# Patient Record
Sex: FEMALE | Race: WHITE | NOT HISPANIC OR LATINO | Employment: OTHER | URBAN - METROPOLITAN AREA
[De-identification: names, ages, dates, MRNs, and addresses within clinical notes are randomized per-mention and may not be internally consistent; named-entity substitution may affect disease eponyms.]

---

## 2017-03-16 ENCOUNTER — OFFICE VISIT (OUTPATIENT)
Dept: FAMILY MEDICINE | Facility: CLINIC | Age: 77
End: 2017-03-16

## 2017-03-16 VITALS
WEIGHT: 180 LBS | BODY MASS INDEX: 29.99 KG/M2 | TEMPERATURE: 97.8 F | HEIGHT: 65 IN | DIASTOLIC BLOOD PRESSURE: 60 MMHG | OXYGEN SATURATION: 97 % | RESPIRATION RATE: 16 BRPM | HEART RATE: 72 BPM | SYSTOLIC BLOOD PRESSURE: 114 MMHG

## 2017-03-16 DIAGNOSIS — J32.0 CHRONIC MAXILLARY SINUSITIS: ICD-10-CM

## 2017-03-16 DIAGNOSIS — R05.9 COUGH: ICD-10-CM

## 2017-03-16 DIAGNOSIS — R09.81 CHRONIC NASAL CONGESTION: Primary | ICD-10-CM

## 2017-03-16 PROCEDURE — 71020 XR CHEST 2 VW: CPT | Performed by: FAMILY MEDICINE

## 2017-03-16 PROCEDURE — 99213 OFFICE O/P EST LOW 20 MIN: CPT | Performed by: FAMILY MEDICINE

## 2017-03-16 PROCEDURE — 70220 X-RAY EXAM OF SINUSES: CPT | Performed by: FAMILY MEDICINE

## 2017-03-16 RX ORDER — CEFUROXIME AXETIL 500 MG/1
500 TABLET ORAL 2 TIMES DAILY
Qty: 42 TABLET | Refills: 0 | Status: SHIPPED | OUTPATIENT
Start: 2017-03-16 | End: 2017-04-06

## 2017-03-16 NOTE — PROGRESS NOTES
Problem(s) Oriented visit        SUBJECTIVE:                                                    Zora Holbrook is a 76 year old female who presents to clinic today for chronic congestion and yellow mucous. This is recurrent for many months. It seems to wax and wane. She does cough up yellow phlegm and hears a rattle in her chest when she lays in bed. She denies regular headaches although will sometimes wake with a pain just over her right eye. No vision problems. She has a hoarse voice. She feels febrile occasionally. No known allergies to environmental exposures. Her  also has chronic rhinitis and she wonders if there could be something in her house causing the issue.       Problem list, Medication list, Allergies, and Medical/Social/Surgical histories reviewed in [facility] and updated as appropriate.   Additional history: as documented    ROS:  5 point ROS completed and negative except noted above, including Gen, CV, Resp, GI, MS      Histories:   Patient Active Problem List   Diagnosis     Breast CA (H)     Hypercholesterolemia     Degenerative arthritis of lumbar spine     Hemorrhoids, external     Colon adenomas     Disorder of bone and cartilage     Osteopenia     Preventative health care     Need for prophylactic vaccination and inoculation against influenza     Health Care Home     Hip pain     Advance Care Planning     Glucose intolerance (impaired glucose tolerance)     Past Surgical History:   Procedure Laterality Date     DILATION AND CURETTAGE, HYSTEROSCOPY DIAGNOSTIC, COMBINED  3/14/2014    Procedure: COMBINED DILATION AND CURETTAGE, HYSTEROSCOPY DIAGNOSTIC;   DILATION AND CURETTAGE, HYSTEROSCOPY, POLYPECTOMY ;  Surgeon: Guillaume Quintanilla MD;  [facility]: [facility]     LUMPECTOMY BREAST  1995    Cancer Right side     [facility]     OVARY SURGERY      resection     Uterine cyste resection      [facility]       Social History   Substance Use Topics     Smoking status:  "Never Smoker     Smokeless tobacco: Never Used     Alcohol use No     Family History   Problem Relation Age of Onset     DIABETES Father      age 60's           OBJECTIVE:                                                    /60  Pulse 72  Temp 97.8  F (36.6  C) (Oral)  Resp 16  Ht 1.645 m (5' 4.75\")  Wt 81.6 kg (180 lb)  SpO2 97%  BMI 30.19 kg/m2  Body mass index is 30.19 kg/(m^2).   APPEARANCE: = Relaxed and in no distress  Conj/Eyelids = noninjected and lids and lashes are without inflammation  PERRLA/Irises = Pupils Round Reactive to Light and Irisis without inflammation  Ears/Nose = External structures and Nares have usual shape and form  Ears/Nose = mucosa erythematous and swollen  Ear canals and TM's = Canals are not inflammed and have none or little wax and the drums are not injected and have a light reflex   Lips/Teeth/Gums = No lesions seen, good dentition, and gums seem healthy  Oropharynx = No leukoplakia, No injection to the tissues, Normal Uvula  Neck = No anterior or posterior adenopathy appreciated.  Thyroid = Not enlarged and no masses felt  Resp effort = Calm regular breathing  Breath Sounds = Good air movement with no rales or rhonchi in any lung fields  Heart Rate, Rythym, & sounds (no Murm)  = Regular rate and rythym with no S3, S4, or murmer appreciated.  Mood/Affect = Cooperative and interested  Right maxillary opacity on sinus xray  CXR with elevated right hemidiaphragm  Sinus xray = opacification of the right maxillary sinus  CXR = normal   Labs Resulted Today:   Results for orders placed or performed during the hospital encounter of 10/10/16   MA Screen Bilateral w/Raf    Narrative    SCREENING MAMMOGRAM, BILATERAL, DIGITAL w/CAD AND TOMOSYNTHESIS -  10/10/2016 10:21 AM.    BREAST SYMPTOMS: No current breast complaints.     COMPARISON:  10/5/2015, 10/1/2014, 9/30/2013, 9/24/2012.    BREAST DENSITY: Heterogeneously dense.    COMMENTS: No findings of suspicion for malignancy.   " The patient has  had a prior right lumpectomy.      Impression    IMPRESSION: BI-RADS CATEGORY: 2 - Benign.    RECOMMENDED FOLLOW-UP: Annual Mammography.  Recommend routine annual screening mammography.    Exam results letter mailed to patient.    LENIN IRBY MD     ASSESSMENT/PLAN:                                                        Zora was seen today for sinus problem.    Diagnoses and all orders for this visit:    Chronic nasal congestion  -     X-ray Sinus complete G/E 3 vws  -     X-ray Chest 2 vws*    Cough  -     X-ray Chest 2 vws*    Chronic maxillary sinusitis  -     cefUROXime (CEFTIN) 500 MG tablet; Take 1 tablet (500 mg) by mouth 2 times daily for 21 days  Given the duration of her symptoms she is treated for 3 weeks. F/U with ENT if failure to improve.  There are no Patient Instructions on file for this visit.    The following health maintenance items are reviewed in Epic and correct as of today:  Health Maintenance   Topic Date Due     MEDICARE ANNUAL WELLNESS VISIT  09/17/2016     FALL RISK ASSESSMENT  09/17/2016     PNEUMOCOCCAL (2 of 2 - PPSV23) 09/17/2016     INFLUENZA VACCINE (SYSTEM ASSIGNED)  09/01/2017     COLONOSCOPY Q5 YR INBASKET MESSAGE  10/29/2018     TETANUS IMMUNIZATION (SYSTEM ASSIGNED)  03/17/2019     LIPID SCREEN Q5 YR FEMALE (SYSTEM ASSIGNED)  11/04/2020     ADVANCE DIRECTIVE PLANNING Q5 YRS (NO INBASKET)  11/10/2020     DEXA SCAN SCREENING (SYSTEM ASSIGNED)  Completed       Clarisa Qureshi MD  [facility]  [facility]  [phone]    For any issues my office # is [phone]

## 2017-03-16 NOTE — MR AVS SNAPSHOT
"              After Visit Summary   3/16/2017    Zora Holbrook    MRN: [id number]           Patient Information     Date Of Birth          1940        Visit Information        Provider Department      3/16/2017 10:00 AM Clarisa Qureshi MD [facility]        Today's Diagnoses     Chronic nasal congestion    -  1    Cough        Chronic maxillary sinusitis           Follow-ups after your visit        Who to contact     If you have questions or need follow up information about today's clinic visit or your schedule please contact [facility] directly at [phone].  Normal or non-critical lab and imaging results will be communicated to you by Wisrhart, letter or phone within 4 business days after the clinic has received the results. If you do not hear from us within 7 days, please contact the clinic through [employer] or phone. If you have a critical or abnormal lab result, we will notify you by phone as soon as possible.  Submit refill requests through HutGrip or call your pharmacy and they will forward the refill request to us. Please allow 3 business days for your refill to be completed.          Additional Information About Your Visit        MyChart Information     HutGrip lets you send messages to your doctor, view your test results, renew your prescriptions, schedule appointments and more. To sign up, go to [email or web address] . Click on \"Log in\" on the left side of the screen, which will take you to the Welcome page. Then click on \"Sign up Now\" on the right side of the page.     You will be asked to enter the access code listed below, as well as some personal information. Please follow the directions to create your username and password.     Your access code is: [email or web address]  Expires: 2017  1:14 PM     Your access code will  in 90 days. If you need help or a new code, please call your [place] clinic or [phone].        Care EveryWhere ID     This is your " "Care EveryWhere ID. This could be used by other organizations to access your [place] medical records  [id number]        Your Vitals Were     Pulse Temperature Respirations Height Pulse Oximetry BMI (Body Mass Index)    72 97.8  F (36.6  C) (Oral) 16 1.645 m (5' 4.75\") 97% 30.19 kg/m2       Blood Pressure from Last 3 Encounters:   03/16/17 114/60   09/19/16 120/80   04/29/16 110/62    Weight from Last 3 Encounters:   03/16/17 81.6 kg (180 lb)   09/19/16 80.7 kg (178 lb)   04/29/16 81.1 kg (178 lb 12.8 oz)              We Performed the Following     X-ray Chest 2 vws*     X-ray Sinus complete G/E 3 vws          Today's Medication Changes          These changes are accurate as of: 3/16/17 11:59 PM.  If you have any questions, ask your nurse or doctor.               Start taking these medicines.        Dose/Directions    cefuroxime 500 MG tablet   Commonly known as:  CEFTIN   Used for:  Chronic maxillary sinusitis        Dose:  500 mg   Take 1 tablet (500 mg) by mouth 2 times daily for 21 days   Quantity:  42 tablet   Refills:  0            Where to get your medicines      These medications were sent to [facility] [address] [place] [address]     Phone:  [phone]     cefuroxime 500 MG tablet                Primary Care Provider Office Phone # Fax #    Clarisa Qureshi [phone]       [facility] [address]  [facility] [address]        Thank you!     Thank you for choosing [facility]  for your care. Our goal is always to provide you with excellent care. Hearing back from our patients is one way we can continue to improve our services. Please take a few minutes to complete the written survey that you may receive in the mail after your visit with us. Thank you!             Your Updated Medication List - Protect others around you: Learn how to safely use, store and throw away your medicines at " www.disposemymeds.org.          This list is accurate as of: 3/16/17 11:59 PM.  Always use your most recent med list.                   Brand Name Dispense Instructions for use    aspirin 81 MG tablet      Take 1 tablet by mouth daily.       CALCIUM 600 + D PO      Take 600 mg by mouth 2 times daily.       cefuroxime 500 MG tablet    CEFTIN    42 tablet    Take 1 tablet (500 mg) by mouth 2 times daily for 21 days       CENTRUM SILVER per tablet      Take 1 tablet by mouth daily.       simvastatin 20 MG tablet    ZOCOR    90 tablet    Take 1 tablet (20 mg) by mouth At Bedtime       TYLENOL 325 MG tablet   Generic drug:  acetaminophen      Take 325-650 mg by mouth daily as needed.       vitamin D 1000 UNITS capsule      Take 1 capsule by mouth daily.

## 2017-04-07 ENCOUNTER — TELEPHONE (OUTPATIENT)
Dept: FAMILY MEDICINE | Facility: CLINIC | Age: 77
End: 2017-04-07

## 2017-04-07 NOTE — TELEPHONE ENCOUNTER
4/5/17 patient calls with update on sinus symptoms -- has 1 day left of 3 week ceftin course. Estimates 80% improved since 3/16/17 visit with Dr. Qureshi.   Plan: per Dr. Qureshi - finish antibiotics and observe, if symptoms don't continue to improve or she gets worse, would recommend CT sinuses. Patient agrees and will call us prn.  Shyanne Gonzalez RN

## 2017-09-19 ENCOUNTER — OFFICE VISIT (OUTPATIENT)
Dept: FAMILY MEDICINE | Facility: CLINIC | Age: 77
End: 2017-09-19

## 2017-09-19 VITALS
WEIGHT: 181.8 LBS | TEMPERATURE: 99 F | HEART RATE: 75 BPM | BODY MASS INDEX: 30.29 KG/M2 | SYSTOLIC BLOOD PRESSURE: 114 MMHG | OXYGEN SATURATION: 95 % | DIASTOLIC BLOOD PRESSURE: 78 MMHG | HEIGHT: 65 IN | RESPIRATION RATE: 16 BRPM

## 2017-09-19 DIAGNOSIS — M85.80 OSTEOPENIA, UNSPECIFIED LOCATION: ICD-10-CM

## 2017-09-19 DIAGNOSIS — R73.02 GLUCOSE INTOLERANCE (IMPAIRED GLUCOSE TOLERANCE): ICD-10-CM

## 2017-09-19 DIAGNOSIS — Z23 NEED FOR PROPHYLACTIC VACCINATION AND INOCULATION AGAINST INFLUENZA: Primary | ICD-10-CM

## 2017-09-19 DIAGNOSIS — R25.2 CRAMP OF LIMB: ICD-10-CM

## 2017-09-19 DIAGNOSIS — E78.5 DYSLIPIDEMIA: ICD-10-CM

## 2017-09-19 LAB
% GRANULOCYTES: 67.8 % (ref 42.2–75.2)
HCT VFR BLD AUTO: 44.5 % (ref 35–46)
HEMOGLOBIN: 14.5 G/DL (ref 11.8–15.5)
LYMPHOCYTES NFR BLD AUTO: 24.5 % (ref 20.5–51.1)
MCH RBC QN AUTO: 29.5 PG (ref 27–31)
MCHC RBC AUTO-ENTMCNC: 32.5 G/DL (ref 33–37)
MCV RBC AUTO: 90.7 FL (ref 80–100)
MONOCYTES NFR BLD AUTO: 7.7 % (ref 1.7–9.3)
PLATELET # BLD AUTO: 131 K/UL (ref 140–450)
RBC # BLD AUTO: 4.9 X10/CMM (ref 3.7–5.2)
WBC # BLD AUTO: 4.7 X10/CMM (ref 3.8–11)

## 2017-09-19 PROCEDURE — 90662 IIV NO PRSV INCREASED AG IM: CPT | Performed by: FAMILY MEDICINE

## 2017-09-19 PROCEDURE — 99214 OFFICE O/P EST MOD 30 MIN: CPT | Mod: 25 | Performed by: FAMILY MEDICINE

## 2017-09-19 PROCEDURE — 36415 COLL VENOUS BLD VENIPUNCTURE: CPT | Performed by: FAMILY MEDICINE

## 2017-09-19 PROCEDURE — G0008 ADMIN INFLUENZA VIRUS VAC: HCPCS | Performed by: FAMILY MEDICINE

## 2017-09-19 PROCEDURE — 85025 COMPLETE CBC W/AUTO DIFF WBC: CPT | Performed by: FAMILY MEDICINE

## 2017-09-19 RX ORDER — SIMVASTATIN 20 MG
20 TABLET ORAL AT BEDTIME
Qty: 90 TABLET | Refills: 3 | Status: SHIPPED | OUTPATIENT
Start: 2017-09-19 | End: 2018-09-14

## 2017-09-19 NOTE — PROGRESS NOTES
Problem(s) Oriented visit        SUBJECTIVE:                                                    Zora Holbrook is a 76 year old female who presents to clinic today for medication check. She has no complaints. She exercises by walking about 30 minutes every morning. No chest pain or pressure with exertion. She has high cholesterol and takes simvastatin. She does tend to get leg cramps that wake her from sleep about 3 times weekly. She had normal colonoscopy in 2013 and denies any change in bowel habits. She had normal mammo done earlier this month.       Problem list, Medication list, Allergies, and Medical/Social/Surgical histories reviewed in EPIC and updated as appropriate.   Additional history: as documented    ROS:  5 point ROS completed and negative except noted above, including Gen, CV, Resp, GI, MS      Histories:   Patient Active Problem List   Diagnosis     Breast CA (H)     Hypercholesterolemia     Degenerative arthritis of lumbar spine     Hemorrhoids, external     Colon adenomas     Osteopenia     Preventative health care     Need for prophylactic vaccination and inoculation against influenza     Health Care Home     Hip pain     Advance Care Planning     Glucose intolerance (impaired glucose tolerance)     Past Surgical History:   Procedure Laterality Date     DILATION AND CURETTAGE, HYSTEROSCOPY DIAGNOSTIC, COMBINED  3/14/2014    Procedure: COMBINED DILATION AND CURETTAGE, HYSTEROSCOPY DIAGNOSTIC;   DILATION AND CURETTAGE, HYSTEROSCOPY, POLYPECTOMY ;  Surgeon: Guillaume Quintanilla MD;  Location: [facility]     LUMPECTOMY BREAST  1995    Cancer Right side     LUMPECTOMY BREAST      [facility],Left side     OVARY SURGERY      resection     Uterine cyste resection      [facility]       Social History   Substance Use Topics     Smoking status: Never Smoker     Smokeless tobacco: Never Used     Alcohol use No     Family History   Problem Relation Age of Onset     DIABETES Father      age 60's      "      OBJECTIVE:                                                    /78  Pulse 75  Temp 99  F (37.2  C) (Oral)  Resp 16  Ht 1.645 m (5' 4.75\")  Wt 82.5 kg (181 lb 12.8 oz)  SpO2 95%  BMI 30.49 kg/m2  Body mass index is 30.49 kg/(m^2).   GENERAL APPEARANCE: Alert, no acute distress  NECK: No adenopathy,masses or thyromegaly  RESP: lungs clear to auscultation   CV: normal rate, regular rhythm, no murmur or gallop  ABDOMEN: soft, no organomegaly, masses or tenderness  LYMPHATICS: No cervical, supraclavicular or inguinal adenopathy  MS: extremities normal, no peripheral edema  NEURO: Alert, oriented, speech and mentation normal  PSYCH: mentation appears normal, affect and mood normal   Labs Resulted Today:   Results for orders placed or performed during the hospital encounter of 10/10/16   MA Screen Bilateral w/Raf    Narrative    SCREENING MAMMOGRAM, BILATERAL, DIGITAL w/CAD AND TOMOSYNTHESIS -  10/10/2016 10:21 AM.    BREAST SYMPTOMS: No current breast complaints.     COMPARISON:  10/5/2015, 10/1/2014, 9/30/2013, 9/24/2012.    BREAST DENSITY: Heterogeneously dense.    COMMENTS: No findings of suspicion for malignancy.   The patient has  had a prior right lumpectomy.      Impression    IMPRESSION: BI-RADS CATEGORY: 2 - Benign.    RECOMMENDED FOLLOW-UP: Annual Mammography.  Recommend routine annual screening mammography.    Exam results letter mailed to patient.    LENIN IRBY MD     ASSESSMENT/PLAN:                                                        Zora was seen today for recheck medication, flu shot and refill request.    Diagnoses and all orders for this visit:    Need for prophylactic vaccination and inoculation against influenza  -     FLU VACCINE, INCREASED ANTIGEN, PRESV FREE  -     ADMIN INFLUENZA VIRUS VAC    Dyslipidemia  -     simvastatin (ZOCOR) 20 MG tablet; Take 1 tablet (20 mg) by mouth At Bedtime  -     Comp. Metabolic Panel (14) ([facility])    Cramp of limb  -     CBC with Diff/Plt " ([facility])  -     Comp. Metabolic Panel (14) ([facility])    Glucose intolerance (impaired glucose tolerance)  -     Hemoglobin A1C ([facility]), printed information is given on diabetic diet.     Osteopenia, unspecified location  -     Vitamin D  25-Hydroxy ([facility])  Continue dietary calcium, weight bearing exercise, and vitamin D supplement.           The following health maintenance items are reviewed in Epic and correct as of today:  Health Maintenance   Topic Date Due     MEDICARE ANNUAL WELLNESS VISIT  09/17/2016     FALL RISK ASSESSMENT  09/17/2016     PNEUMOCOCCAL (2 of 2 - PPSV23) 09/17/2016     INFLUENZA VACCINE (SYSTEM ASSIGNED)  09/01/2017     COLONOSCOPY Q5 YR  10/29/2018     TETANUS IMMUNIZATION (SYSTEM ASSIGNED)  03/17/2019     LIPID SCREEN Q5 YR FEMALE (SYSTEM ASSIGNED)  11/04/2020     ADVANCE DIRECTIVE PLANNING Q5 YRS  11/10/2020     DEXA SCAN SCREENING (SYSTEM ASSIGNED)  Completed       Clarisa Qureshi MD  [facility]  Family Practice  [facility]  [phone]    For any issues my office # is [phone]

## 2017-09-19 NOTE — MR AVS SNAPSHOT
"              After Visit Summary   9/19/2017    Zora Holbrook    MRN: [id number]           Patient Information     Date Of Birth          1940        Visit Information        Provider Department      9/19/2017 10:00 AM Clarisa Qureshi MD [facility]        Today's Diagnoses     Need for prophylactic vaccination and inoculation against influenza    -  1    Dyslipidemia        Cramp of limb        Glucose intolerance (impaired glucose tolerance)        Osteopenia, unspecified location           Follow-ups after your visit        Your next 10 appointments already scheduled     Oct 16, 2017 10:00 AM CDT   MA SCREENING BILATERAL W/ FERNANDO with [id number]   [facility] ([facility])    [address] [place] [address], [address]  [facility] [address] [phone]           Three-dimensional (3D) mammograms are available at [place] locations in [facility], [place], [place], [facility], [place], [place], and [place]. [facility] locations include [place] and [facility] in [place]. Benefits of 3D mammograms include: - Improved rate of cancer detection - Decreases your chance of having to go back for more tests, which means fewer: - \"False-positive\" results (This means that there is an abnormal area but it isn't cancer.) - Invasive testing procedures, such as a biopsy or surgery - Can provide clearer images of the breast if you have dense breast tissue. 3D mammography is an optional exam that anyone can have with a 2D mammogram. It doesn't replace or take the place of a 2D mammogram. 2D mammograms remain an effective screening test for all women.  Not all insurance companies cover the cost of a 3D mammogram. Check with your insurance.              Who to contact     If you have questions or need follow up information about today's clinic visit or your schedule please contact [facility] directly at [phone].  Normal or " "non-critical lab and imaging results will be communicated to you by MyChart, letter or phone within 4 business days after the clinic has received the results. If you do not hear from us within 7 days, please contact the clinic through NICEt or phone. If you have a critical or abnormal lab result, we will notify you by phone as soon as possible.  Submit refill requests through Provigent or call your pharmacy and they will forward the refill request to us. Please allow 3 business days for your refill to be completed.          Additional Information About Your Visit        Provigent Information     Provigent lets you send messages to your doctor, view your test results, renew your prescriptions, schedule appointments and more. To sign up, go to [email or web address] . Click on \"Log in\" on the left side of the screen, which will take you to the Welcome page. Then click on \"Sign up Now\" on the right side of the page.     You will be asked to enter the access code listed below, as well as some personal information. Please follow the directions to create your username and password.     Your access code is: [email or web address]  Expires: 2017  1:20 PM     Your access code will  in 90 days. If you need help or a new code, please call your [place] clinic or [phone].        Care EveryWhere ID     This is your Care EveryWhere ID. This could be used by other organizations to access your [place] medical records  [id number]        Your Vitals Were     Pulse Temperature Respirations Height Pulse Oximetry BMI (Body Mass Index)    75 99  F (37.2  C) (Oral) 16 1.645 m (5' 4.75\") 95% 30.49 kg/m2       Blood Pressure from Last 3 Encounters:   17 114/78   17 114/60   16 120/80    Weight from Last 3 Encounters:   17 82.5 kg (181 lb 12.8 oz)   17 81.6 kg (180 lb)   16 80.7 kg (178 lb)              We Performed the Following     ADMIN INFLUENZA VIRUS VAC     CBC with Diff/Plt ([facility])     Comp. " Metabolic Panel (14) ([facility])     FLU VACCINE, INCREASED ANTIGEN, PRESV FREE     Hemoglobin A1C ([facility])     Vitamin D  25-Hydroxy ([facility])          Where to get your medicines      These medications were sent to [facility] - [place], [place] - [address]  [address], [facility] [address]     Phone:  [phone]     simvastatin 20 MG tablet          Primary Care Provider Office Phone # Fax #    Clarisa Tori Qureshi [phone]       [facility] [address] [facility] [address]        Equal Access to Services     [facility] Hospital[facility]EL : Hadii aad ku hadasho Soomaali, waaxda luqadaha, qaybta kaalmada adeegyada, zuri peck . So [facility] [phone].    ATENCIÓN: Si habla español, tiene a victoria disposición servicios gratuitos de asistencia lingüística. [facility] [phone].    We comply with applicable federal civil rights laws and [place] laws. We do not discriminate on the basis of race, color, national origin, age, disability sex, sexual orientation or gender identity.            Thank you!     Thank you for choosing [facility]  for your care. Our goal is always to provide you with excellent care. Hearing back from our patients is one way we can continue to improve our services. Please take a few minutes to complete the written survey that you may receive in the mail after your visit with us. Thank you!             Your Updated Medication List - Protect others around you: Learn how to safely use, store and throw away your medicines at www.disposemymeds.org.          This list is accurate as of: 9/19/17  1:20 PM.  Always use your most recent med list.                   Brand Name Dispense Instructions for use Diagnosis    aspirin 81 MG tablet      Take 1 tablet by mouth daily.        CALCIUM 600 + D PO      Take 600 mg by mouth 2 times daily.        CENTRUM SILVER per tablet      Take 1 tablet by mouth daily.         simvastatin 20 MG tablet    ZOCOR    90 tablet    Take 1 tablet (20 mg) by mouth At Bedtime    Dyslipidemia       TYLENOL 325 MG tablet   Generic drug:  acetaminophen      Take 325-650 mg by mouth daily as needed.        vitamin D 1000 UNITS capsule      Take 1 capsule by mouth daily.

## 2017-09-19 NOTE — PROGRESS NOTES
Injectable Influenza Immunization Documentation    1.  Is the person to be vaccinated sick today? No    2. Does the person to be vaccinated have an allergy to a component   of the vaccine? No    3. Has the person to be vaccinated ever had a serious reaction   to influenza vaccine in the past? No    4. Has the person to be vaccinated ever had Guillain-Barré syndrome? No    Form completed by Keely Mcgee MA

## 2017-09-19 NOTE — LETTER
[facility]  [address] [place]  [address]  Phone: [phone]    September 21, 2017      Zora Holbrook  [address] [facility] [address]              Dear Zora,    The results from your recent visit showed No reason for cramping is found. Recommend drinking more liquids. Platelets are mildly low, but not worrisome. Vitamin D level is normal. Diabetes test is still only in the pre-diabetes range. Continue low carb diet and add exercise and hopefully this won't progress.        Sincerely,     Clarisa Qureshi M.D.    Results for orders placed or performed in visit on 09/19/17   CBC with Diff/Plt ([facility])   Result Value Ref Range    WBC x10/cmm 4.7 3.8 - 11.0 x10/cmm    % Lymphocytes 24.5 20.5 - 51.1 %    % Monocytes 7.7 1.7 - 9.3 %    % Granulocytes 67.8 42.2 - 75.2 %    RBC x10/cmm 4.90 3.7 - 5.2 x10/cmm    Hemoglobin 14.5 11.8 - 15.5 g/dl    Hematocrit 44.5 35 - 46 %    MCV 90.7 80 - 100 fL    MCH 29.5 27.0 - 31.0 pg    MCHC 32.5 (A) 33.0 - 37.0 g/dL    Platelet Count 131 (A) 140 - 450 K/uL   Comp. Metabolic Panel (14) ([facility])   Result Value Ref Range    Glucose 106 (H) 65 - 99 mg/dL    Urea Nitrogen 17 8 - 27 mg/dL    Creatinine 1.03 (H) 0.57 - 1.00 mg/dL    eGFR If NonAfricn Am 52 (L) >59 mL/min/1.73    eGFR If Africn Am 60 >59 mL/min/1.73    BUN/Creatinine Ratio 17 12 - 28    Sodium 143 134 - 144 mmol/L    Potassium 4.2 3.5 - 5.2 mmol/L    Chloride 101 96 - 106 mmol/L    Total CO2 23 18 - 28 mmol/L    Calcium 9.1 8.7 - 10.3 mg/dL    Protein Total 7.0 6.0 - 8.5 g/dL    Albumin 4.6 3.5 - 4.8 g/dL    Globulin, Total 2.4 1.5 - 4.5 g/dL    A/G Ratio 1.9 1.2 - 2.2    Bilirubin Total 0.8 0.0 - 1.2 mg/dL    Alkaline Phosphatase 51 39 - 117 IU/L    AST 23 0 - 40 IU/L    ALT 17 0 - 32 IU/L    Narrative    Performed at:  01 - [facility] [address], [place], [place]  [id number]  : Sudheer Tovar MD, Phone:  [phone]   Hemoglobin A1C ([facility])   Result Value Ref  Range    Hemoglobin A1C 6.0 (H) 4.8 - 5.6 %    Narrative    Performed at:  01 - [facility] [address], [place], [place]  [id number]  : Sudheer Tovar MD, Phone:  [phone]   Vitamin D  25-Hydroxy ([facility])   Result Value Ref Range    Vitamin D,25-Hydroxy 64.4 30.0 - 100.0 ng/mL    Narrative    Performed at:  01 - [facility] [address], [place], [place]  [id number]  : Sudheer Tovar MD, Phone:  [phone]

## 2017-09-20 LAB
ALBUMIN SERPL-MCNC: 4.6 G/DL (ref 3.5–4.8)
ALBUMIN/GLOB SERPL: 1.9 {RATIO} (ref 1.2–2.2)
ALP SERPL-CCNC: 51 IU/L (ref 39–117)
ALT SERPL-CCNC: 17 IU/L (ref 0–32)
AST SERPL-CCNC: 23 IU/L (ref 0–40)
BILIRUB SERPL-MCNC: 0.8 MG/DL (ref 0–1.2)
BUN SERPL-MCNC: 17 MG/DL (ref 8–27)
BUN/CREATININE RATIO: 17 (ref 12–28)
CALCIUM SERPL-MCNC: 9.1 MG/DL (ref 8.7–10.3)
CHLORIDE SERPLBLD-SCNC: 101 MMOL/L (ref 96–106)
CREAT SERPL-MCNC: 1.03 MG/DL (ref 0.57–1)
EGFR IF AFRICN AM: 60 ML/MIN/1.73
EGFR IF NONAFRICN AM: 52 ML/MIN/1.73
GLOBULIN, TOTAL: 2.4 G/DL (ref 1.5–4.5)
GLUCOSE SERPL-MCNC: 106 MG/DL (ref 65–99)
HBA1C MFR BLD: 6 % (ref 4.8–5.6)
POTASSIUM SERPL-SCNC: 4.2 MMOL/L (ref 3.5–5.2)
PROT SERPL-MCNC: 7 G/DL (ref 6–8.5)
SODIUM SERPL-SCNC: 143 MMOL/L (ref 134–144)
TOTAL CO2: 23 MMOL/L (ref 18–28)
VITAMIN D, 25-HYDROXY: 64.4 NG/ML (ref 30–100)

## 2017-10-10 ENCOUNTER — OFFICE VISIT (OUTPATIENT)
Dept: FAMILY MEDICINE | Facility: CLINIC | Age: 77
End: 2017-10-10

## 2017-10-10 VITALS
SYSTOLIC BLOOD PRESSURE: 116 MMHG | BODY MASS INDEX: 30.35 KG/M2 | RESPIRATION RATE: 16 BRPM | TEMPERATURE: 97.8 F | DIASTOLIC BLOOD PRESSURE: 78 MMHG | OXYGEN SATURATION: 98 % | WEIGHT: 181 LBS | HEART RATE: 68 BPM

## 2017-10-10 DIAGNOSIS — R51.9 MILD HEADACHE: ICD-10-CM

## 2017-10-10 DIAGNOSIS — R58 BLEEDING: Primary | ICD-10-CM

## 2017-10-10 LAB
% GRANULOCYTES: 67.6 % (ref 42.2–75.2)
HCT VFR BLD AUTO: 48.8 % (ref 35–46)
HEMOGLOBIN: 15.8 G/DL (ref 11.8–15.5)
LYMPHOCYTES NFR BLD AUTO: 24.2 % (ref 20.5–51.1)
MCH RBC QN AUTO: 29.6 PG (ref 27–31)
MCHC RBC AUTO-ENTMCNC: 32.5 G/DL (ref 33–37)
MCV RBC AUTO: 91.2 FL (ref 80–100)
MONOCYTES NFR BLD AUTO: 8.2 % (ref 1.7–9.3)
PLATELET # BLD AUTO: 130 K/UL (ref 140–450)
RBC # BLD AUTO: 5.35 X10/CMM (ref 3.7–5.2)
WBC # BLD AUTO: 4.9 X10/CMM (ref 3.8–11)

## 2017-10-10 PROCEDURE — 85025 COMPLETE CBC W/AUTO DIFF WBC: CPT | Performed by: FAMILY MEDICINE

## 2017-10-10 PROCEDURE — 36415 COLL VENOUS BLD VENIPUNCTURE: CPT | Performed by: FAMILY MEDICINE

## 2017-10-10 PROCEDURE — 99213 OFFICE O/P EST LOW 20 MIN: CPT | Performed by: FAMILY MEDICINE

## 2017-10-10 NOTE — PROGRESS NOTES
"Cc sinus symptoms    HCM due for PPSV23  SUBJECTIVE:   Zora Holbrook is a 77 year old female who presents to clinic today for the following health issues:  White female glasses  RESPIRATORY SYMPTOMS \"Sinus\"      Duration: years now blood in mucus spitting in the sink last Friday . Awakens with HA over right eye.Better today    No allergy    Never smoker    Travel no    Exposure    Description  See above    Severity: mild    Accompanying signs and symptoms: None    History (predisposing factors):  none    Precipitating or alleviating factors: tylenol for HA helped    Therapies tried and outcome:  rest and fluids acetaminophen            Problem list and histories reviewed & adjusted, as indicated.  Additional history: as documented    Patient Active Problem List   Diagnosis     Breast CA (H)     Hypercholesterolemia     Degenerative arthritis of lumbar spine     Hemorrhoids, external     Colon adenomas     Osteopenia     Preventative health care     Need for prophylactic vaccination and inoculation against influenza     Health Care Home     Hip pain     Advance Care Planning     Glucose intolerance (impaired glucose tolerance)     Past Surgical History:   Procedure Laterality Date     DILATION AND CURETTAGE, HYSTEROSCOPY DIAGNOSTIC, COMBINED  3/14/2014    Procedure: COMBINED DILATION AND CURETTAGE, HYSTEROSCOPY DIAGNOSTIC;   DILATION AND CURETTAGE, HYSTEROSCOPY, POLYPECTOMY ;  Surgeon: Guillaume Quintanilla MD;  Location: [facility]     LUMPECTOMY BREAST  1995    Cancer Right side     [facility] BRE[facility]     OVARY SURGERY      resection     Uterine cyste resection      [facility]       Social History   Substance Use Topics     Smoking status: Never Smoker     Smokeless tobacco: Never Used     Alcohol use No     Family History   Problem Relation Age of Onset     DIABETES Father      age 60's         Current Outpatient Prescriptions   Medication Sig Dispense Refill     simvastatin (ZOCOR) 20 MG " "tablet Take 1 tablet (20 mg) by mouth At Bedtime 90 tablet 3     aspirin 81 MG tablet Take 1 tablet by mouth daily.       acetaminophen (TYLENOL) 325 MG tablet Take 325-650 mg by mouth daily as needed.       Calcium Carbonate-Vitamin D (CALCIUM 600 + D OR) Take 600 mg by mouth 2 times daily.       Multiple Vitamins-Minerals (CENTRUM SILVER) per tablet Take 1 tablet by mouth daily.       Cholecalciferol (VITAMIN D) 1000 UNIT capsule Take 1 capsule by mouth daily.       Allergies   Allergen Reactions     Nsaids      Affects kidney tests     Blue Dyes Rash     Recent Labs   Lab Test  09/19/17   1052  09/19/16   1049  04/29/16   1025  11/04/15   1422  08/28/14   1045 08/28/14 08/22/13   A1C  6.0*  5.8*  5.7*   --    --    --    --    --    LDL   --    --    --   70   --   71   --   76   HDL   --    --    --   49   --   42   --   45   TRIG   --    --    --   103   --   119   --   133   ALT  17   --    --   19  21   --    < >   --    CR  1.03*   --    --   0.95  0.91   --    < >   --    POTASSIUM  4.2   --    --   4.4  4.1   --    < >   --     < > = values in this interval not displayed.      BP Readings from Last 3 Encounters:   10/10/17 116/78   09/19/17 114/78   03/16/17 114/60    Wt Readings from Last 3 Encounters:   10/10/17 82.1 kg (181 lb)   09/19/17 82.5 kg (181 lb 12.8 oz)   03/16/17 81.6 kg (180 lb)       Estimated body mass index is 30.35 kg/(m^2) as calculated from the following:    Height as of 9/19/17: 1.645 m (5' 4.75\").    Weight as of this encounter: 82.1 kg (181 lb).  Weight loss is recommended           Labs reviewed in EPIC          Reviewed and updated as needed this visit by clinical staff     Reviewed and updated as needed this visit by Provider         ROS:  Constitutional, HEENT, cardiovascular, pulmonary, GI, , musculoskeletal, neuro, skin, endocrine and psych systems are negative, except as otherwise noted.      OBJECTIVE:   /78 (Cuff Size: Adult Large)  Pulse 68  Temp 97.8  F (36.6 "  C) (Oral)  Resp 16  Wt 82.1 kg (181 lb)  SpO2 98%  BMI 30.35 kg/m2  There is no height or weight on file to calculate BMI.  GENERAL: healthy, alert and no distress  EYES: Eyes grossly normal to inspection, PERRL and conjunctivae and sclerae normal  HENT: ear canals and TM's normal, nose and mouth without ulcers or lesions SINUS no tenderness  NECK: no adenopathy, no asymmetry, masses, or scars and thyroid normal to palpation  RESP: lungs clear to auscultation - no rales, rhonchi or wheezes  CV: regular rate and rhythm, normal S1 S2, no S3 or S4, no murmur, click or rub, no peripheral edema and peripheral pulses strong  ABDOMEN: soft, nontender, no hepatosplenomegaly, no masses and bowel sounds normal  MS: no gross musculoskeletal defects noted, no edema  SKIN: no suspicious lesions or rashes  NEURO: Normal strength and tone, mentation intact and speech normal  PSYCH: mentation appears normal, affect normal/bright    Diagnostic Test Results:  Results for orders placed or performed in visit on 09/19/17   CBC with Diff/Plt ([facility])   Result Value Ref Range    WBC x10/cmm 4.7 3.8 - 11.0 x10/cmm    % Lymphocytes 24.5 20.5 - 51.1 %    % Monocytes 7.7 1.7 - 9.3 %    % Granulocytes 67.8 42.2 - 75.2 %    RBC x10/cmm 4.90 3.7 - 5.2 x10/cmm    Hemoglobin 14.5 11.8 - 15.5 g/dl    Hematocrit 44.5 35 - 46 %    MCV 90.7 80 - 100 fL    MCH 29.5 27.0 - 31.0 pg    MCHC 32.5 (A) 33.0 - 37.0 g/dL    Platelet Count 131 (A) 140 - 450 K/uL   Comp. Metabolic Panel (14) ([facility])   Result Value Ref Range    Glucose 106 (H) 65 - 99 mg/dL    Urea Nitrogen 17 8 - 27 mg/dL    Creatinine 1.03 (H) 0.57 - 1.00 mg/dL    eGFR If NonAfricn Am 52 (L) >59 mL/min/1.73    eGFR If Africn Am 60 >59 mL/min/1.73    BUN/Creatinine Ratio 17 12 - 28    Sodium 143 134 - 144 mmol/L    Potassium 4.2 3.5 - 5.2 mmol/L    Chloride 101 96 - 106 mmol/L    Total CO2 23 18 - 28 mmol/L    Calcium 9.1 8.7 - 10.3 mg/dL    Protein Total 7.0 6.0 - 8.5 g/dL    Albumin 4.6  3.5 - 4.8 g/dL    Globulin, Total 2.4 1.5 - 4.5 g/dL    A/G Ratio 1.9 1.2 - 2.2    Bilirubin Total 0.8 0.0 - 1.2 mg/dL    Alkaline Phosphatase 51 39 - 117 IU/L    AST 23 0 - 40 IU/L    ALT 17 0 - 32 IU/L    Narrative    Performed at:  01 - [facility] [address], [place], [place]  [id number]  : Sudheer Tovar MD, Phone:  [phone]   Hemoglobin A1C ([facility])   Result Value Ref Range    Hemoglobin A1C 6.0 (H) 4.8 - 5.6 %    Narrative    Performed at:  01 - [facility] [address], [place], [place]  [id number]  : Sudheer Tovar MD, Phone:  [phone]   Vitamin D  25-Hydroxy ([facility])   Result Value Ref Range    Vitamin D,25-Hydroxy 64.4 30.0 - 100.0 ng/mL    Narrative    Performed at:  01 - [facility] [address], [place], [place]  [id number]  : Sudheer Tovar MD, Phone:  [phone]     No bruising or bleeding elsewhere  No bleeding brushing teeth  No NSAIDs  No GERD    ASSESSMENT/PLAN:     ASSESSMENT / PLAN:  (R58) Bleeding  (primary encounter diagnosis)  Comment:   Lab Results   Component Value Date    WBC 4.9 10/10/2017    WBC 5.3 08/28/2008     Lab Results   Component Value Date    RBC 5.35 10/10/2017    RBC 4.74 08/28/2008     Lab Results   Component Value Date    HGB 15.8 10/10/2017     Lab Results   Component Value Date    HCT 48.8 10/10/2017     Lab Results   Component Value Date    MCV 91.2 10/10/2017     Lab Results   Component Value Date    MCH 29.6 10/10/2017     Lab Results   Component Value Date    MCHC 32.5 10/10/2017     Lab Results   Component Value Date    RDW 13.7 08/28/2008     Lab Results   Component Value Date     10/10/2017       Plan: CBC with Diff/Plt ([facility])            Likely minor irritation and can be watched. Try normal saline rinses.    Mild HA relieved with tylenol. Observe      Patient Instructions   CBC is ok today    There probably is mild irritation that can just be watched      Hansa Hood MD  [facility]  GROUP

## 2017-10-10 NOTE — LETTER
[facility]  [address] [place]  [address]  Phone: [phone]    October 13, 2017      Zora Holbrook  [address] [facility] [address]              Dear Zora,    The results from your recent visit showed that your platelets are about the same and Increase in hemoglobin/hematocrit.       Sincerely,     Hansa Hood M.D.    Results for orders placed or performed in visit on 10/10/17   CBC with Diff/Plt ([facility])   Result Value Ref Range    WBC x10/cmm 4.9 3.8 - 11.0 x10/cmm    % Lymphocytes 24.2 20.5 - 51.1 %    % Monocytes 8.2 1.7 - 9.3 %    % Granulocytes 67.6 42.2 - 75.2 %    RBC x10/cmm 5.35 (A) 3.7 - 5.2 x10/cmm    Hemoglobin 15.8 (A) 11.8 - 15.5 g/dl    Hematocrit 48.8 (A) 35 - 46 %    MCV 91.2 80 - 100 fL    MCH 29.6 27.0 - 31.0 pg    MCHC 32.5 (A) 33.0 - 37.0 g/dL    Platelet Count 130 (A) 140 - 450 K/uL

## 2017-10-10 NOTE — MR AVS SNAPSHOT
"              After Visit Summary   10/10/2017    Zora Holbrook    MRN: [id number]           Patient Information     Date Of Birth          1940        Visit Information        Provider Department      10/10/2017 10:30 AM Hansa Hood MD [facility]        Today's Diagnoses     Bleeding    -  1      Care Instructions    CBC is ok today    There probably is mild irritation that can just be watched          Follow-ups after your visit        Your next 10 appointments already scheduled     Oct 16, 2017 10:00 AM CDT   MA SCREENING BILATERAL W/ FERNANDO with [id number]   [facility])    [address] [place] [address], [address]  [facility] [address] [phone]           Three-dimensional (3D) mammograms are available at [place] locations in [facility], [place], [facility], [facility], and [place]. -[facility] locations include [place] and [facility] in [place]. Benefits of 3D mammograms include: - Improved rate of cancer detection - Decreases your chance of having to go back for more tests, which means fewer: - \"False-positive\" results (This means that there is an abnormal area but it isn't cancer.) - Invasive testing procedures, such as a biopsy or surgery - Can provide clearer images of the breast if you have dense breast tissue. 3D mammography is an optional exam that anyone can have with a 2D mammogram. It doesn't replace or take the place of a 2D mammogram. 2D mammograms remain an effective screening test for all women.  Not all insurance companies cover the cost of a 3D mammogram. Check with your insurance.              Who to contact     If you have questions or need follow up information about today's clinic visit or your schedule please contact [facility] directly at [phone].  Normal or non-critical lab and imaging results will be communicated to you by MyChart, letter or phone " "within 4 business days after the clinic has received the results. If you do not hear from us within 7 days, please contact the clinic through [employer] or phone. If you have a critical or abnormal lab result, we will notify you by phone as soon as possible.  Submit refill requests through [employer] or call your pharmacy and they will forward the refill request to us. Please allow 3 business days for your refill to be completed.          Additional Information About Your Visit        [employer] Information     [employer] lets you send messages to your doctor, view your test results, renew your prescriptions, schedule appointments and more. To sign up, go to [email or web address] . Click on \"Log in\" on the left side of the screen, which will take you to the Welcome page. Then click on \"Sign up Now\" on the right side of the page.     You will be asked to enter the access code listed below, as well as some personal information. Please follow the directions to create your username and password.     Your access code is: [email or web address]  Expires: 2017  1:20 PM     Your access code will  in 90 days. If you need help or a new code, please call your [facility] clinic or [phone].        Care EveryWhere ID     This is your Care EveryWhere ID. This could be used by other organizations to access your [facility] medical records  [id number]        Your Vitals Were     Pulse Temperature Respirations Pulse Oximetry BMI (Body Mass Index)       68 97.8  F (36.6  C) (Oral) 16 98% 30.35 kg/m2        Blood Pressure from Last 3 Encounters:   10/10/17 116/78   17 114/78   17 114/60    Weight from Last 3 Encounters:   10/10/17 82.1 kg (181 lb)   17 82.5 kg (181 lb 12.8 oz)   17 81.6 kg (180 lb)              We Performed the Following     CBC with Diff/Plt ([facility])        Primary Care Provider Office Phone # Fax #    Clarisa Qureshi [phone]       [facility] [address]  [facility] [address]        Equal Access to Services     [facility] Center[facility]VEL : Hadderrell yoana arshad matti Sorachel, waerosda luqadaha, qaybta kaheshamsarthak eduardotorreysarthak, zuri sandoval andreasmargarito sarabiaraeganhaily kendall. So [facility] [phone].    ATENCIÓN: Si habla español, tiene a victoria disposición servicios gratuitos de asistencia lingüística. Rabiaame al [phone].    We comply with applicable federal civil rights laws and [place] laws. We do not discriminate on the basis of race, color, national origin, age, disability, sex, sexual orientation, or gender identity.            Thank you!     Thank you for choosing [facility]  for your care. Our goal is always to provide you with excellent care. Hearing back from our patients is one way we can continue to improve our services. Please take a few minutes to complete the written survey that you may receive in the mail after your visit with us. Thank you!             Your Updated Medication List - Protect others around you: Learn how to safely use, store and throw away your medicines at www.disposemymeds.org.          This list is accurate as of: 10/10/17 11:00 AM.  Always use your most recent med list.                   Brand Name Dispense Instructions for use Diagnosis    aspirin 81 MG tablet      Take 1 tablet by mouth daily.        CALCIUM 600 + D PO      Take 600 mg by mouth 2 times daily.        CENTRUM SILVER per tablet      Take 1 tablet by mouth daily.        simvastatin 20 MG tablet    ZOCOR    90 tablet    Take 1 tablet (20 mg) by mouth At Bedtime    Dyslipidemia       TYLENOL 325 MG tablet   Generic drug:  acetaminophen      Take 325-650 mg by mouth daily as needed.        vitamin D 1000 UNITS capsule      Take 1 capsule by mouth daily.

## 2017-10-16 ENCOUNTER — HOSPITAL ENCOUNTER (OUTPATIENT)
Dept: MAMMOGRAPHY | Facility: CLINIC | Age: 77
Discharge: HOME | End: 2017-10-16
Attending: FAMILY MEDICINE | Admitting: FAMILY MEDICINE
Payer: MEDICARE

## 2017-10-16 DIAGNOSIS — Z12.31 VISIT FOR SCREENING MAMMOGRAM: ICD-10-CM

## 2017-10-16 PROCEDURE — G0202 SCR MAMMO BI INCL CAD: HCPCS

## 2018-09-24 ENCOUNTER — OFFICE VISIT (OUTPATIENT)
Dept: FAMILY MEDICINE | Facility: CLINIC | Age: 78
End: 2018-09-24

## 2018-09-24 VITALS
SYSTOLIC BLOOD PRESSURE: 122 MMHG | HEART RATE: 73 BPM | OXYGEN SATURATION: 96 % | DIASTOLIC BLOOD PRESSURE: 70 MMHG | WEIGHT: 181.6 LBS | BODY MASS INDEX: 30.26 KG/M2 | HEIGHT: 65 IN | RESPIRATION RATE: 16 BRPM

## 2018-09-24 DIAGNOSIS — R73.03 PRE-DIABETES: ICD-10-CM

## 2018-09-24 DIAGNOSIS — Z12.31 ENCOUNTER FOR SCREENING MAMMOGRAM FOR BREAST CANCER: ICD-10-CM

## 2018-09-24 DIAGNOSIS — Z23 NEED FOR PROPHYLACTIC VACCINATION AND INOCULATION AGAINST INFLUENZA: ICD-10-CM

## 2018-09-24 DIAGNOSIS — M85.80 OSTEOPENIA, UNSPECIFIED LOCATION: ICD-10-CM

## 2018-09-24 DIAGNOSIS — E78.5 DYSLIPIDEMIA: Primary | ICD-10-CM

## 2018-09-24 DIAGNOSIS — M85.851 OSTEOPENIA OF BOTH HIPS: ICD-10-CM

## 2018-09-24 DIAGNOSIS — M85.852 OSTEOPENIA OF BOTH HIPS: ICD-10-CM

## 2018-09-24 DIAGNOSIS — R25.2 CRAMP OF LIMB: ICD-10-CM

## 2018-09-24 DIAGNOSIS — Z23 NEED FOR PNEUMOCOCCAL VACCINATION: ICD-10-CM

## 2018-09-24 LAB
% GRANULOCYTES: 67.3 % (ref 42.2–75.2)
HCT VFR BLD AUTO: 45.9 % (ref 35–46)
HEMOGLOBIN: 15.2 G/DL (ref 11.8–15.5)
LYMPHOCYTES NFR BLD AUTO: 24.4 % (ref 20.5–51.1)
MCH RBC QN AUTO: 30 PG (ref 27–31)
MCHC RBC AUTO-ENTMCNC: 33.1 G/DL (ref 33–37)
MCV RBC AUTO: 90.6 FL (ref 80–100)
MONOCYTES NFR BLD AUTO: 8.3 % (ref 1.7–9.3)
PLATELET # BLD AUTO: 158 K/UL (ref 140–450)
RBC # BLD AUTO: 5.07 X10/CMM (ref 3.7–5.2)
WBC # BLD AUTO: 5 X10/CMM (ref 3.8–11)

## 2018-09-24 PROCEDURE — 90732 PPSV23 VACC 2 YRS+ SUBQ/IM: CPT | Performed by: FAMILY MEDICINE

## 2018-09-24 PROCEDURE — 36415 COLL VENOUS BLD VENIPUNCTURE: CPT | Performed by: FAMILY MEDICINE

## 2018-09-24 PROCEDURE — G0008 ADMIN INFLUENZA VIRUS VAC: HCPCS | Performed by: FAMILY MEDICINE

## 2018-09-24 PROCEDURE — 85025 COMPLETE CBC W/AUTO DIFF WBC: CPT | Performed by: FAMILY MEDICINE

## 2018-09-24 PROCEDURE — 90662 IIV NO PRSV INCREASED AG IM: CPT | Performed by: FAMILY MEDICINE

## 2018-09-24 PROCEDURE — 99214 OFFICE O/P EST MOD 30 MIN: CPT | Mod: 25 | Performed by: FAMILY MEDICINE

## 2018-09-24 PROCEDURE — G0009 ADMIN PNEUMOCOCCAL VACCINE: HCPCS | Performed by: FAMILY MEDICINE

## 2018-09-24 NOTE — PROGRESS NOTES
Problem(s) Oriented visit      SUBJECTIVE:                                                    Zora Holbrook is a 78 year old female who presents to clinic today for:  Patient presents with:  Recheck Medication: Fasting for lipids. Requests 90 day supply for med  Flu Shot    Pt usually sees PCP Dr. Clarisa Qureshi. She is not available today. I have been reassigned as pt's new PCP. Pt is new to me today.     Appt originally scheduled as a physical. However, pt states she does not want a physical - prefers an office visit today to have her labs checked and meds refilled. States states she rec'd bills from physicals in the past and does not like that Medical Wellness exams do not necessarily include a physical exam.     She states she is from [place]. Lived her many years w/ her . His health is starting to fail. And he is able to do less around the house. She mows the lawn and does most of the physical activities around the house.     She is taking simvastatin. Cholesterol under good control with this. However, is concerned - has muscle cramps. Worse at night, but frequent. On set is not known. She has not tried a holiday from Simvastatin to see if she feels better of this statin. Is willing to try a drug holiday.     Has pre-diabetes. A1c last year 6.0%. No polydipsia, polyuria.     Occas HA w/ sinuses. No enough to bother her today. No f/c/s. No ST. No ear pain. No cough. Has not attempted to take anything for this.     Has varicose veins in LLE - she does not think they bother her. Has not associated her leg cramps at night w/ her varicose veins.     Problem list, Medication list, Allergies, and Medical/Social/Surgical histories reviewed in [facility] and updated as appropriate.     ROS:  10 point ROS completed and negative except noted above, including Gen, HEENT, CV, Resp, GI, , MS, Neurologic, Psych    Histories:   Patient Active Problem List   Diagnosis     Breast CA (H)     Hypercholesterolemia      "Degenerative arthritis of lumbar spine     Hemorrhoids, external     Colon adenomas     Osteopenia     Preventative health care     Need for prophylactic vaccination and inoculation against influenza     Health Care Home     Hip pain     Advance Care Planning     Glucose intolerance (impaired glucose tolerance)     Past Medical History:   Diagnosis Date     ACP (advance care planning) 4-23-13    form given     Breast CA (H) 1995    lumpectomy and Radiation     Breast cancer (H)      Colon adenomas     Tubolar adenoma, colonoscopy, 2008     Degenerative arthritis of lumbar spine 2004    L5-S1     Hemorrhoids, external      Hypercholesterolemias      Osteopenia 2009     PONV (postoperative nausea and vomiting)      Renal disease     Stage 2     Past Surgical History:   Procedure Laterality Date     DILATION AND CURETTAGE, HYSTEROSCOPY DIAGNOSTIC, COMBINED  3/14/2014    Procedure: COMBINED DILATION AND CURETTAGE, HYSTEROSCOPY DIAGNOSTIC;   DILATION AND CURETTAGE, HYSTEROSCOPY, POLYPECTOMY ;  Surgeon: Guillaume Quintanilla MD;  Location: [facility]     LUMPECTOMY BREAST  1995    Cancer Right side     LUMPECTOMY BREAST      [facility],Left side     OVARY SURGERY      resection     [facility]     Social History   Substance Use Topics     Smoking status: Never Smoker     Smokeless tobacco: Never Used     Alcohol use No     Family History   Problem Relation Age of Onset     Diabetes Father      age 60's       OBJECTIVE:                                                    /70  Pulse 73  Resp 16  Ht 1.651 m (5' 5\")  Wt 82.4 kg (181 lb 9.6 oz)  SpO2 96%  BMI 30.22 kg/m2  Gen: Cooperative. No acute distress. Obese body habitus noted.   Eyes: PERRL, sclera white.   Ears/Nose/Mouth/Throat: Normal pinnae. Small layer of sloughed skin cells in R ear canal. Unable to see R TM. She declines intervention. L ear canals normal and L TM without erythema or effusion. Oropharynx mucous " membranes moist, without lesions, erythema, or exudate. She notes a little tenderness over frontal sinuses and small vol clear rhinorrhea present today.   Neck: Supple, without masses, lymphadenopathy or tenderness.   Heart: Regular rate and rhythm without murmurs, rubs, or gallops.   Lungs: Normal respiratory effort. Lungs are clear to auscultation. Good breath sounds bilaterally.   Abdomen: Soft, nontender, normal bowel sounds present. No obvious masses or organomegaly.  Musculoskeletal: No lower extremity edema. Tortuous varicose vein in LLE.   Skin: Warm and well perfused.   Neurologic: Alert, oriented x3, nonfocal. CN II-XII grossly intact. Strength 5/5. Sensation intact.   Psych:  Mood and affect are appropriate.     ASSESSMENT/PLAN:                                                      Zora was seen today for recheck medication and flu shot.    Diagnoses and all orders for this visit:    Dyslipidemia  -     simvastatin (ZOCOR) 20 MG tablet; Take 1 tablet (20 mg) by mouth At Bedtime  -     Lipid Panel ([facility])  -     Comp. Metabolic Panel (14) ([facility])   Agreed to a drug holiday for 2 weeks. Wrote out instructions on AVS for her.     Cramp of limb  -     CBC with Diff/Plt ([facility])  -     Comp. Metabolic Panel (14) ([facility])  -     Ferritin  Serum ([facility])  -     Iron and TIBC ([facility])  -     TSH ([facility])    Pre-diabetes  -     Hemoglobin A1C ([facility])  -     CBC with Diff/Plt ([facility])  -     Comp. Metabolic Panel (14) ([facility])    Osteopenia, unspecified location  -     DEXA - Hip/Pelvis/Spine (FUTURE/[facility]); Future    Need for pneumococcal vaccination  -     PNEUMOCOCCAL VACCINE,ADULT,SQ OR IM  -     ADMIN PNEUMOCOCCAL VACCINE   Counseled pt on components of vaccinations given today. No known complications w/ vaccination administrations today.     Need for prophylactic vaccination and inoculation against influenza  -     FLU VACCINE, INCREASED ANTIGEN, PRESV FREE, AGE 65+ [90883]  -     ADMIN  INFLUENZA (For MEDICARE Patients ONLY) []    Encounter for screening mammogram for breast cancer  -     MAMMO -  Screening Digital Bilateral ([facility]); Future   She opts to continue to have mammograms.     AVS Instructions  OK try generic Claritin or Allegra for your allergies and sinuses.   For your muscles cramps, let's stop taking SIMVASTATIN for 2 weeks. Let me know if the cramps are better or no change. Sometimes this medicine may cause muscle cramps.   - If no better in 2 weeks, then resume SIMVASTATIN.   - If better in 2 weeks, let me know and we'll select a different medication to manage your cholesterol.       The following health maintenance items are reviewed in Epic and correct as of today:  Health Maintenance   Topic Date Due     COLONOSCOPY Q5 YR  10/29/2018     TETANUS IMMUNIZATION (SYSTEM ASSIGNED)  03/17/2019     MEDICARE ANNUAL WELLNESS VISIT  09/24/2019     FALL RISK ASSESSMENT  09/24/2019     PHQ-2 Q1 YR  09/24/2019     ADVANCE DIRECTIVE PLANNING Q5 YRS  11/10/2020     LIPID SCREEN Q5 YR FEMALE (SYSTEM ASSIGNED)  09/24/2023     DEXA SCAN SCREENING (SYSTEM ASSIGNED)  Completed     PNEUMOCOCCAL  Completed     INFLUENZA VACCINE  Completed       Silvana Elam MD  Family Medicine    For any issues, please call my office  [facility] at [phone].

## 2018-09-24 NOTE — MR AVS SNAPSHOT
After Visit Summary   9/24/2018    Zora Holbrook    MRN: [id number]           Patient Information     Date Of Birth          1940        Visit Information        Provider Department      9/24/2018 10:00 AM Silvana Elam MD [facility]        Today's Diagnoses     Hypercholesterolemia    -  1    Need for pneumococcal vaccination        Need for prophylactic vaccination and inoculation against influenza        Dyslipidemia        Cramp of limb        Pre-diabetes        Encounter for screening mammogram for breast cancer        Osteopenia, unspecified location          Care Instructions      Preventive Health Recommendations    Female Ages 65 +    Yearly exam:     See your health care provider every year in order to  o Review health changes.   o Discuss preventive care.    o Review your medicines if your doctor has prescribed any.      You no longer need a yearly Pap test unless you've had an abnormal Pap test in the past 10 years. If you have vaginal symptoms, such as bleeding or discharge, be sure to talk with your provider about a Pap test.      Every 1 to 2 years, have a mammogram.  If you are over 69, talk with your health care provider about whether or not you want to continue having screening mammograms.      Every 10 years, have a colonoscopy. Or, have a yearly FIT test (stool test). These exams will check for colon cancer.       Have a cholesterol test every 5 years, or more often if your doctor advises it.       Have a diabetes test (fasting glucose) every three years. If you are at risk for diabetes, you should have this test more often.       At age 65, have a bone density scan (DEXA) to check for osteoporosis (brittle bone disease).    Shots:    Get a flu shot each year.    Get a tetanus shot every 10 years.    Talk to your doctor about your pneumonia vaccines. There are now two you should receive - Pneumovax (PPSV 23) and Prevnar (PCV 13).    Talk to your  pharmacist about the shingles vaccine.    Talk to your doctor about the hepatitis B vaccine.    Nutrition:     Eat at least 5 servings of fruits and vegetables each day.      Eat whole-grain bread, whole-wheat pasta and brown rice instead of white grains and rice.      Get adequate Calcium and Vitamin D.     Lifestyle    Exercise at least 150 minutes a week (30 minutes a day, 5 days a week). This will help you control your weight and prevent disease.      Limit alcohol to one drink per day.      No smoking.       Wear sunscreen to prevent skin cancer.       See your dentist twice a year for an exam and cleaning.      See your eye doctor every 1 to 2 years to screen for conditions such as glaucoma, macular degeneration and cataracts.    Health Maintenance   Topic Date Due     MEDICARE ANNUAL WELLNESS VISIT  09/17/2016     FALL RISK ASSESSMENT  09/17/2016     PNEUMOCOCCAL (2 of 2 - PPSV23) 09/17/2016     PHQ-2 Q1 YR  09/17/2016     INFLUENZA VACCINE (1) 09/01/2018     COLONOSCOPY Q5 YR  10/29/2018     TETANUS IMMUNIZATION (SYSTEM ASSIGNED)  03/17/2019     LIPID SCREEN Q5 YR FEMALE (SYSTEM ASSIGNED)  11/04/2020     ADVANCE DIRECTIVE PLANNING Q5 YRS  11/10/2020     DEXA SCAN SCREENING (SYSTEM ASSIGNED)  Completed     OK try generic Claritin or Allegra for your allergies and sinuses.     For your muscles cramps, let's stop taking SIMVASTATIN for 2 weeks. Let me know if the cramps are better or no change. Sometimes this medicine may cause muscle cramps.   - If no better in 2 weeks, then resume SIMVASTATIN.   - If better in 2 weeks, let me know and we'll select a different medication to manage your cholesterol.           Follow-ups after your visit        Future tests that were ordered for you today     Open Future Orders        Priority Expected Expires Ordered    DEXA - Hip/Pelvis/Spine (FUTURE/[facility]) Routine  9/24/2019 9/24/2018    MAMMO -  Screening Digital Bilateral (FUTURE/[facility]) Routine  9/24/2019  "2018            Who to contact     If you have questions or need follow up information about today's clinic visit or your schedule please contact [facility] directly at [phone].  Normal or non-critical lab and imaging results will be communicated to you by MyChart, letter or phone within 4 business days after the clinic has received the results. If you do not hear from us within 7 days, please contact the clinic through MyChart or phone. If you have a critical or abnormal lab result, we will notify you by phone as soon as possible.  Submit refill requests through [employer] or call your pharmacy and they will forward the refill request to us. Please allow 3 business days for your refill to be completed.          Additional Information About Your Visit        [employer] [employer] Information     [employer] lets you send messages to your doctor, view your test results, renew your prescriptions, schedule appointments and more. To sign up, go to [email or web address] . Click on \"Log in\" on the left side of the screen, which will take you to the Welcome page. Then click on \"Sign up Now\" on the right side of the page.     You will be asked to enter the access code listed below, as well as some personal information. Please follow the directions to create your username and password.     Your access code is: [email or web address]  Expires: 2018 10:41 AM     Your access code will  in 90 days. If you need help or a new code, please call your [place] clinic or [phone].        Care EveryWhere ID     This is your Care EveryWhere ID. This could be used by other organizations to access your [place] medical records  [id number]        Your Vitals Were     Pulse Respirations Height Pulse Oximetry BMI (Body Mass Index)       73 16 1.651 m (5' 5\") 96% 30.22 kg/m2        Blood Pressure from Last 3 Encounters:   18 122/70   10/10/17 116/78   17 114/78    Weight from Last 3 Encounters:   18 82.4 kg (181 " lb 9.6 oz)   10/10/17 82.1 kg (181 lb)   09/19/17 82.5 kg (181 lb 12.8 oz)              We Performed the Following     ADMIN INFLUENZA (For MEDICARE Patients ONLY) []     ADMIN PNEUMOCOCCAL VACCINE     CBC with Diff/Plt ([facility])     Comp. Metabolic Panel (14) ([facility])     Ferritin  Serum ([facility])     FLU VACCINE, INCREASED ANTIGEN, PRESV FREE, AGE 65+ [86693]     Hemoglobin A1C ([facility])     Iron and TIBC ([facility])     Lipid Panel ([facility])     PNEUMOCOCCAL VACCINE,ADULT,SQ OR IM     TSH ([facility])        Primary Care Provider Office Phone # Fax #    Silvana Siri Elam [phone] [address] [facility] [address]        Equal Access to Services     JEANNINE [facility] CenterPAVEL : Hadii yoana arshad hadasho Soomaali, waaxda luqadaha, qaybta kaalmada adeegyada, zuri peck . So [facility] [phone].    ATENCIÓN: Si habla español, tiene a victoria disposición servicios gratuitos de asistencia lingüística. Llame al [phone].    We comply with applicable federal civil rights laws and [place] laws. We do not discriminate on the basis of race, color, national origin, age, disability, sex, sexual orientation, or gender identity.            Thank you!     Thank you for choosing [facility]  for your care. Our goal is always to provide you with excellent care. Hearing back from our patients is one way we can continue to improve our services. Please take a few minutes to complete the written survey that you may receive in the mail after your visit with us. Thank you!             Your Updated Medication List - Protect others around you: Learn how to safely use, store and throw away your medicines at www.disposemymeds.org.          This list is accurate as of 9/24/18 10:41 AM.  Always use your most recent med list.                   Brand Name Dispense Instructions for use Diagnosis    aspirin 81 MG tablet      Take 1 tablet by mouth daily.        CALCIUM 600 + D PO      Take  600 mg by mouth 2 times daily.        CENTRUM SILVER per tablet      Take 1 tablet by mouth daily.        simvastatin 20 MG tablet    ZOCOR    30 tablet    TAKE 1 TABLET AT BEDTIME    Dyslipidemia       TYLENOL 325 MG tablet   Generic drug:  acetaminophen      Take 325-650 mg by mouth daily as needed.        vitamin D 1000 units capsule      Take 1 capsule by mouth daily.

## 2018-09-24 NOTE — PATIENT INSTRUCTIONS
Preventive Health Recommendations    Female Ages 65 +    Yearly exam:     See your health care provider every year in order to  o Review health changes.   o Discuss preventive care.    o Review your medicines if your doctor has prescribed any.      You no longer need a yearly Pap test unless you've had an abnormal Pap test in the past 10 years. If you have vaginal symptoms, such as bleeding or discharge, be sure to talk with your provider about a Pap test.      Every 1 to 2 years, have a mammogram.  If you are over 69, talk with your health care provider about whether or not you want to continue having screening mammograms.      Every 10 years, have a colonoscopy. Or, have a yearly FIT test (stool test). These exams will check for colon cancer.       Have a cholesterol test every 5 years, or more often if your doctor advises it.       Have a diabetes test (fasting glucose) every three years. If you are at risk for diabetes, you should have this test more often.       At age 65, have a bone density scan (DEXA) to check for osteoporosis (brittle bone disease).    Shots:    Get a flu shot each year.    Get a tetanus shot every 10 years.    Talk to your doctor about your pneumonia vaccines. There are now two you should receive - Pneumovax (PPSV 23) and Prevnar (PCV 13).    Talk to your pharmacist about the shingles vaccine.    Talk to your doctor about the hepatitis B vaccine.    Nutrition:     Eat at least 5 servings of fruits and vegetables each day.      Eat whole-grain bread, whole-wheat pasta and brown rice instead of white grains and rice.      Get adequate Calcium and Vitamin D.     Lifestyle    Exercise at least 150 minutes a week (30 minutes a day, 5 days a week). This will help you control your weight and prevent disease.      Limit alcohol to one drink per day.      No smoking.       Wear sunscreen to prevent skin cancer.       See your dentist twice a year for an exam and cleaning.      See your eye doctor  every 1 to 2 years to screen for conditions such as glaucoma, macular degeneration and cataracts.    Health Maintenance   Topic Date Due     MEDICARE ANNUAL WELLNESS VISIT  09/17/2016     FALL RISK ASSESSMENT  09/17/2016     PNEUMOCOCCAL (2 of 2 - PPSV23) 09/17/2016     PHQ-2 Q1 YR  09/17/2016     INFLUENZA VACCINE (1) 09/01/2018     COLONOSCOPY Q5 YR  10/29/2018     TETANUS IMMUNIZATION (SYSTEM ASSIGNED)  03/17/2019     LIPID SCREEN Q5 YR FEMALE (SYSTEM ASSIGNED)  11/04/2020     ADVANCE DIRECTIVE PLANNING Q5 YRS  11/10/2020     DEXA SCAN SCREENING (SYSTEM ASSIGNED)  Completed     OK try generic Claritin or Allegra for your allergies and sinuses.     For your muscles cramps, let's stop taking SIMVASTATIN for 2 weeks. Let me know if the cramps are better or no change. Sometimes this medicine may cause muscle cramps.   - If no better in 2 weeks, then resume SIMVASTATIN.   - If better in 2 weeks, let me know and we'll select a different medication to manage your cholesterol.

## 2018-09-24 NOTE — LETTER
October 8, 2018      Zora Michelle Sheron  [address]  [place] [address]        Dear ,    We are writing to inform you of your test results.    Your labs look good overall and are improved from last year.   Your blood counts are all in a normal range.   Your cholesterol is well controlled. I understand your muscles feel better after holding your statin for about 2 weeks. Let's stop the simvastatin entirely. I wrote a new prescription for you today for 30 days for pravastatin and sent this to the [facility] in [place]. Pravastatin is much less likely to cause muscle aches than simvastatin. Simvastatin 20 mg and pravastatin 40 mg are equivalent doses (I did not want you to think I increased the dose when we changed statins). If this med works well for you, we can send the next prescription to [facility] for 90 days at a time with several refills for you.   Your liver and kidney labs were all normal.   Your fasting glucose was elevated again this year. However, your hemoglobin A1c, which you can think of as a test of your blood sugar over the last 90 days, is normal. Thus, this looks like pre-diabetes. Please know we do not diagnose diabetes until the A1c reaches 6.5% or the fasting glucose is higher than 125 on two separate blood draws.   Your iron levels look fine.   Your ferritin is slightly elevated. This is possibly a mild inflammatory response. Let's recheck this non-fasting lab again it you have any residual muscle pain or the next time you have blood drawn.   Your thyroid results are in a good range.   Overall, very nice results.   Keep up the good work!    Resulted Orders   Lipid Panel ([facility])   Result Value Ref Range    Cholesterol 143 100 - 199 mg/dL    Triglycerides 101 0 - 149 mg/dL    HDL Cholesterol 49 >39 mg/dL    VLDL Cholesterol Adolfo 20 5 - 40 mg/dL    LDL Cholesterol Calculated 74 0 - 99 mg/dL    LDL/HDL Ratio 1.5 0.0 - 3.2 ratio      Comment:                                           LDL/HDL Ratio                                              Men  Women                                1/2 Avg.Risk  1.0    1.5                                    Avg.Risk  3.6    3.2                                 2X Avg.Risk  6.2    5.0                                 3X Avg.Risk  8.0    6.1      Narrative    Performed at:  01 - [facility] [address], [place], [place]  [id number]  : Reilly Baldwin MD, Phone:  [phone]   Hemoglobin A1C ([facility])   Result Value Ref Range    Hemoglobin A1C 5.7 (H) 4.8 - 5.6 %      Comment:               Prediabetes: 5.7 - 6.4           Diabetes: >6.4           Glycemic control for adults with diabetes: <7.0      Narrative    Performed at:  01 - [facility] [address], [place], [place]  [id number]  : Reilly Baldwin MD, Phone:  [phone]   CBC with Diff/Plt ([facility])   Result Value Ref Range    WBC x10/cmm 5.0 3.8 - 11.0 x10/cmm    % Lymphocytes 24.4 20.5 - 51.1 %    % Monocytes 8.3 1.7 - 9.3 %    % Granulocytes 67.3 42.2 - 75.2 %    RBC x10/cmm 5.07 3.7 - 5.2 x10/cmm    Hemoglobin 15.2 11.8 - 15.5 g/dl    Hematocrit 45.9 35 - 46 %    MCV 90.6 80 - 100 fL    MCH 30.0 27.0 - 31.0 pg    MCHC 33.1 33.0 - 37.0 g/dL    Platelet Count 158 140 - 450 K/uL   Comp. Metabolic Panel (14) ([facility])   Result Value Ref Range    Glucose 109 (H) 65 - 99 mg/dL    Urea Nitrogen 19 8 - 27 mg/dL    Creatinine 0.83 0.57 - 1.00 mg/dL    eGFR If NonAfricn Am 67 >59 mL/min/1.73    eGFR If Africn Am 78 >59 mL/min/1.73    BUN/Creatinine Ratio 23 12 - 28    Sodium 142 134 - 144 mmol/L    Potassium 4.3 3.5 - 5.2 mmol/L    Chloride 101 96 - 106 mmol/L    Total CO2 26 20 - 29 mmol/L    Calcium 9.7 8.7 - 10.3 mg/dL    Protein Total 7.0 6.0 - 8.5 g/dL    Albumin 4.6 3.5 - 4.8 g/dL    Globulin, Total 2.4 1.5 - 4.5 g/dL    A/G Ratio 1.9 1.2 - 2.2    Bilirubin Total 0.9 0.0 - 1.2 mg/dL    Alkaline Phosphatase 57 39 - 117 IU/L    AST 22 0 - 40 IU/L    ALT 19 0 - 32 IU/L     Narrative    Performed at:  01 - [facility] [address], [place], [place]  [id number]  : Reilly Baldwin MD, Phone:  [phone]   Fer[facility]in  [facility]   Result Value Ref Range    Ferritin 162 (H) 15 - 150 ng/mL    Narrative    Performed at:  01 - [facility] [address], [place], [place]  [id number]  : Reilly Baldwin MD, Phone:  [phone]   Iron and TIBC ([facility])   Result Value Ref Range    Iron Binding Cap 290 250 - 450 ug/dL    UIBC 167 118 - 369 ug/dL    Iron 123 27 - 139 ug/dL    Iron Saturation 42 15 - 55 %    Narrative    Performed at:  01 - [facility] [address], [place], [place]  [id number]  : Reilly Baldwin MD, Phone:  [phone]   TSH ([facility])   Result Value Ref Range    TSH 3.450 0.450 - 4.500 uIU/mL    Narrative    Performed at:  01 - [facility] [address], [place], [place]  [id number]  : Reilly Baldwin MD, Phone:  [phone]       If you have any questions or concerns, please call the clinic at the number listed above.       Sincerely,        PARIS Elam MD  [place], [facility]

## 2018-09-25 LAB
ALBUMIN SERPL-MCNC: 4.6 G/DL (ref 3.5–4.8)
ALBUMIN/GLOB SERPL: 1.9 {RATIO} (ref 1.2–2.2)
ALP SERPL-CCNC: 57 IU/L (ref 39–117)
ALT SERPL-CCNC: 19 IU/L (ref 0–32)
AST SERPL-CCNC: 22 IU/L (ref 0–40)
BILIRUB SERPL-MCNC: 0.9 MG/DL (ref 0–1.2)
BUN SERPL-MCNC: 19 MG/DL (ref 8–27)
BUN/CREATININE RATIO: 23 (ref 12–28)
CALCIUM SERPL-MCNC: 9.7 MG/DL (ref 8.7–10.3)
CHLORIDE SERPLBLD-SCNC: 101 MMOL/L (ref 96–106)
CHOLEST SERPL-MCNC: 143 MG/DL (ref 100–199)
CREAT SERPL-MCNC: 0.83 MG/DL (ref 0.57–1)
EGFR IF AFRICN AM: 78 ML/MIN/1.73
EGFR IF NONAFRICN AM: 67 ML/MIN/1.73
FERRITIN SERPL-MCNC: 162 NG/ML (ref 15–150)
GLOBULIN, TOTAL: 2.4 G/DL (ref 1.5–4.5)
GLUCOSE SERPL-MCNC: 109 MG/DL (ref 65–99)
HBA1C MFR BLD: 5.7 % (ref 4.8–5.6)
HDLC SERPL-MCNC: 49 MG/DL
IRON BINDING CAP: 290 UG/DL (ref 250–450)
IRON SATURATION: 42 % (ref 15–55)
IRON: 123 UG/DL (ref 27–139)
LDL/HDL RATIO: 1.5 RATIO (ref 0–3.2)
LDLC SERPL CALC-MCNC: 74 MG/DL (ref 0–99)
POTASSIUM SERPL-SCNC: 4.3 MMOL/L (ref 3.5–5.2)
PROT SERPL-MCNC: 7 G/DL (ref 6–8.5)
SODIUM SERPL-SCNC: 142 MMOL/L (ref 134–144)
TOTAL CO2: 26 MMOL/L (ref 20–29)
TRIGL SERPL-MCNC: 101 MG/DL (ref 0–149)
TSH BLD-ACNC: 3.45 UIU/ML (ref 0.45–4.5)
UIBC: 167 UG/DL (ref 118–369)
VLDLC SERPL CALC-MCNC: 20 MG/DL (ref 5–40)

## 2018-09-30 RX ORDER — SIMVASTATIN 20 MG
20 TABLET ORAL AT BEDTIME
Qty: 90 TABLET | Refills: 3 | Status: SHIPPED | OUTPATIENT
Start: 2018-09-30 | End: 2018-09-17 | Stop reason: SINTOL

## 2018-10-05 ENCOUNTER — TELEPHONE (OUTPATIENT)
Dept: FAMILY MEDICINE | Facility: CLINIC | Age: 78
End: 2018-10-05

## 2018-10-05 DIAGNOSIS — E78.5 HYPERLIPIDEMIA, UNSPECIFIED HYPERLIPIDEMIA TYPE: Primary | ICD-10-CM

## 2018-10-05 DIAGNOSIS — M85.80 OSTEOPENIA: ICD-10-CM

## 2018-10-05 RX ORDER — PRAVASTATIN SODIUM 40 MG
40 TABLET ORAL AT BEDTIME
Qty: 30 TABLET | Refills: 0 | Status: SHIPPED | OUTPATIENT
Start: 2018-10-05 | End: 2018-10-17

## 2018-10-05 NOTE — LETTER
"[facility]  [address] [place]  [address]  Phone: [phone]    October 5, 2018      Zora Holbrook  [address] [facility] [address]              Dear Zora,    Your labs look good overall and are improved from last year.   Your blood counts are all in a normal range.   Your cholesterol is well controlled. I understand your muscles feel better after holding your statin for about 2 weeks. Let's stop the simvastatin entirely. I wrote a new prescription for you today for 30 days for pravastatin and sent this to the [facility] in [place]. Pravastatin is much less likely to cause muscle aches than simvastatin. Simvastatin 20 mg and pravastatin 40 mg are equivalent doses (I did not want you to think I increased the dose when we changed statins). If this med works well for you, we can send the next prescription to [facility] for 90 days at a time with several refills for you.   Your liver and kidney labs were all normal.   Your fasting glucose was elevated again this year. However, your hemoglobin A1c, which you can think of as a test of your blood sugar over the last 90 days, is normal. Thus, this looks like pre-diabetes. Please know we do not diagnose diabetes until the A1c reaches 6.5% or the fasting glucose is higher than 125 on two separate blood draws.   Your iron levels look fine.   Your ferritin is slightly elevated. This is possibly a mild inflammatory response. Let's recheck this non-fasting lab again it you have any residual muscle pain or the next time you have blood drawn.   Your thyroid results are in a good range.   Overall, very nice results.   Keep up the good work!        Sincerely,     Silvana \"Belle\" MD Papa    Results for orders placed or performed in visit on 09/24/18   Lipid Panel ([facility])   Result Value Ref Range    Cholesterol 143 100 - 199 mg/dL    Triglycerides 101 0 - 149 mg/dL    HDL Cholesterol 49 >39 mg/dL    VLDL Cholesterol Adolfo 20 5 - " 40 mg/dL    LDL Cholesterol Calculated 74 0 - 99 mg/dL    LDL/HDL Ratio 1.5 0.0 - 3.2 ratio    Narrative    Performed at:  01 - [facility] [address], [place], [place]  [id number]  : Reilly Baldwin MD, Phone:  [phone]   Hemoglobin A1C ([facility])   Result Value Ref Range    Hemoglobin A1C 5.7 (H) 4.8 - 5.6 %    Narrative    Performed at:  01 - [facility] [address], [place], [place]  [id number]  : Reilly Baldwin MD, Phone:  [phone]   CBC with Diff/Plt ([facility])   Result Value Ref Range    WBC x10/cmm 5.0 3.8 - 11.0 x10/cmm    % Lymphocytes 24.4 20.5 - 51.1 %    % Monocytes 8.3 1.7 - 9.3 %    % Granulocytes 67.3 42.2 - 75.2 %    RBC x10/cmm 5.07 3.7 - 5.2 x10/cmm    Hemoglobin 15.2 11.8 - 15.5 g/dl    Hematocrit 45.9 35 - 46 %    MCV 90.6 80 - 100 fL    MCH 30.0 27.0 - 31.0 pg    MCHC 33.1 33.0 - 37.0 g/dL    Platelet Count 158 140 - 450 K/uL   Comp. Metabolic Panel (14) ([facility])   Result Value Ref Range    Glucose 109 (H) 65 - 99 mg/dL    Urea Nitrogen 19 8 - 27 mg/dL    Creatinine 0.83 0.57 - 1.00 mg/dL    eGFR If NonAfricn Am 67 >59 mL/min/1.73    eGFR If Africn Am 78 >59 mL/min/1.73    BUN/Creatinine Ratio 23 12 - 28    Sodium 142 134 - 144 mmol/L    Potassium 4.3 3.5 - 5.2 mmol/L    Chloride 101 96 - 106 mmol/L    Total CO2 26 20 - 29 mmol/L    Calcium 9.7 8.7 - 10.3 mg/dL    Protein Total 7.0 6.0 - 8.5 g/dL    Albumin 4.6 3.5 - 4.8 g/dL    Globulin, Total 2.4 1.5 - 4.5 g/dL    A/G Ratio 1.9 1.2 - 2.2    Bilirubin Total 0.9 0.0 - 1.2 mg/dL    Alkaline Phosphatase 57 39 - 117 IU/L    AST 22 0 - 40 IU/L    ALT 19 0 - 32 IU/L    Narrative    Performed at:  01 - [facility] [address], [place], [place]  [id number]  : Reilly Baldwin MD, Phone:  [phone]   Ferritin  Serum ([facility])   Result Value Ref Range    Ferritin 162 (H) 15 - 150 ng/mL    Narrative    Performed at:  01 - [facility] [address], [place], [place]  [id number]  : Reilly Baldwin  MD, Phone:  [phone]   Iron and TIBC ([facility])   Result Value Ref Range    Iron Binding Cap 290 250 - 450 ug/dL    UIBC 167 118 - 369 ug/dL    Iron 123 27 - 139 ug/dL    Iron Saturation 42 15 - 55 %    Narrative    Performed at:  01 - [facility] [address], [place], [place]  [id number]  : Reilly Baldwin MD, Phone:  [phone]   TSH ([facility])   Result Value Ref Range    TSH 3.450 0.450 - 4.500 uIU/mL    Narrative    Performed at:  01 - [facility] [address], [place], [place]  [id number]  : Reilly Baldwin MD, Phone:  [phone]

## 2018-10-05 NOTE — TELEPHONE ENCOUNTER
Patient called with update.  She has been holding her simvastatin since appointment with Dr Yanez on 9/24/18.  She calls to update the muscle cramping is improved since holding medication.  She is requesting alternative prescription.  She is also requesting lab results from 9/24/18-advised of normal results.  Note forwarded to Dr Yanez for new medication and comments on lab results. Katya Perez

## 2018-10-05 NOTE — TELEPHONE ENCOUNTER
I am happy to know she feels better.   New rx for pravastatin sent to her pharmacy.   See my long comments on your lab letter completed today. Thank you.

## 2018-10-17 DIAGNOSIS — E78.5 HYPERLIPIDEMIA, UNSPECIFIED HYPERLIPIDEMIA TYPE: ICD-10-CM

## 2018-10-17 RX ORDER — PRAVASTATIN SODIUM 40 MG
40 TABLET ORAL AT BEDTIME
Qty: 90 TABLET | Refills: 3 | Status: SHIPPED | OUTPATIENT
Start: 2018-10-17 | End: 2019-09-20

## 2018-10-17 NOTE — TELEPHONE ENCOUNTER
Patient calls reporting tolerating pravastatin 40mg tabs that she started ~10/5/18 and would like rx sent to [facility] for 90 days supply.   Did discuss should repeat fasting lipids in ~6 weeks. Patient not too keen on this as transportation is a problem for her, but does agree.  Plan: forwarded refill request to Dr. Belle Elam.  Shyanne Gonzalez RN

## 2018-10-22 ENCOUNTER — HOSPITAL ENCOUNTER (OUTPATIENT)
Dept: BONE DENSITY | Facility: CLINIC | Age: 78
End: 2018-10-22
Attending: FAMILY MEDICINE
Payer: MEDICARE

## 2018-10-22 ENCOUNTER — HOSPITAL ENCOUNTER (OUTPATIENT)
Dept: MAMMOGRAPHY | Facility: CLINIC | Age: 78
Discharge: HOME | End: 2018-10-22
Attending: FAMILY MEDICINE | Admitting: FAMILY MEDICINE
Payer: MEDICARE

## 2018-10-22 DIAGNOSIS — M85.851 OSTEOPENIA OF BOTH HIPS: ICD-10-CM

## 2018-10-22 DIAGNOSIS — Z12.31 ENCOUNTER FOR SCREENING MAMMOGRAM FOR BREAST CANCER: ICD-10-CM

## 2018-10-22 DIAGNOSIS — M85.852 OSTEOPENIA OF BOTH HIPS: ICD-10-CM

## 2018-10-22 PROCEDURE — 77085 DXA BONE DENSITY AXL VRT FX: CPT

## 2018-10-22 PROCEDURE — 77067 SCR MAMMO BI INCL CAD: CPT

## 2018-10-29 ENCOUNTER — HOSPITAL ENCOUNTER (OUTPATIENT)
Dept: MAMMOGRAPHY | Facility: CLINIC | Age: 78
End: 2018-10-29
Attending: FAMILY MEDICINE
Payer: MEDICARE

## 2018-10-29 DIAGNOSIS — R92.8 ABNORMAL MAMMOGRAM: ICD-10-CM

## 2018-10-29 PROCEDURE — G0279 TOMOSYNTHESIS, MAMMO: HCPCS

## 2019-03-21 ENCOUNTER — OFFICE VISIT (OUTPATIENT)
Dept: FAMILY MEDICINE | Facility: CLINIC | Age: 79
End: 2019-03-21

## 2019-03-21 VITALS
HEART RATE: 85 BPM | OXYGEN SATURATION: 95 % | SYSTOLIC BLOOD PRESSURE: 142 MMHG | RESPIRATION RATE: 16 BRPM | BODY MASS INDEX: 31.15 KG/M2 | WEIGHT: 187.2 LBS | DIASTOLIC BLOOD PRESSURE: 80 MMHG | TEMPERATURE: 98.3 F

## 2019-03-21 DIAGNOSIS — J32.0 CHRONIC MAXILLARY SINUSITIS: Primary | ICD-10-CM

## 2019-03-21 PROCEDURE — 99213 OFFICE O/P EST LOW 20 MIN: CPT | Performed by: FAMILY MEDICINE

## 2019-03-21 RX ORDER — CEFUROXIME AXETIL 500 MG/1
500 TABLET ORAL 2 TIMES DAILY
Qty: 42 TABLET | Refills: 0 | Status: SHIPPED | OUTPATIENT
Start: 2019-03-21 | End: 2019-09-30

## 2019-03-21 NOTE — PROGRESS NOTES
Problem(s) Oriented visit        SUBJECTIVE:                                                    Zora Holbrook is a 78 year old female who presents to clinic today for sweats and chills that come and go for a day, then can feel okay for another day or two. She denies headache or face pain, but her nose can get stuffy and she has a lot of green mucous that she coughs up every morning, but then the cough is gone. No post nasal drip. This has been going on for about 6 weeks. No abdominal pain, no dysuria, no chest pain.      Problem list, Medication list, Allergies, and Medical/Social/Surgical histories reviewed in EPIC and updated as appropriate.   Additional history: as documented    ROS:  5 point ROS completed and negative except noted above, including Gen, CV, Resp, GI, MS      Histories:   Patient Active Problem List   Diagnosis     Breast CA (H)     Hypercholesterolemia     Degenerative arthritis of lumbar spine     Hemorrhoids, external     Colon adenomas     Osteopenia     Preventative health care     Need for prophylactic vaccination and inoculation against influenza     Health Care Home     Hip pain     Advance Care Planning     Glucose intolerance (impaired glucose tolerance)     Past Surgical History:   Procedure Laterality Date     DILATION AND CURETTAGE, HYSTEROSCOPY DIAGNOSTIC, COMBINED  3/14/2014    Procedure: COMBINED DILATION AND CURETTAGE, HYSTEROSCOPY DIAGNOSTIC;   DILATION AND CURETTAGE, HYSTEROSCOPY, POLYPECTOMY ;  Surgeon: Guillaume Quintanilla MD;  Location: [facility]     LUMPECTOMY BREAST  1995    Cancer Right side     LUMPECTOMY BREAST      [facility],Left side     OVARY SURGERY      resection     Uterine cyste resection      [facility]       Social History     Tobacco Use     Smoking status: Never Smoker     Smokeless tobacco: Never Used   Substance Use Topics     Alcohol use: No     Alcohol/week: 0.0 oz     Family History   Problem Relation Age of Onset     Diabetes Father         age 60's            OBJECTIVE:                                                    /80   Pulse 85   Temp 98.3  F (36.8  C)   Resp 16   Wt 84.9 kg (187 lb 3.2 oz)   SpO2 95%   BMI 31.15 kg/m    Body mass index is 31.15 kg/m .   GENERAL APPEARANCE: Alert, no acute distress  EYES: PERRL, EOM normal, conjunctiva and lids normal  HENT: right TM normal, left TM normal, throat/mouth:thick yellow/green mucous seen streaming down the right posterior pharynx  NECK: No adenopathy,masses or thyromegaly  RESP: lungs clear to auscultation   CV: normal rate, regular rhythm, no murmur or gallop  MS: extremities normal, no peripheral edema  NEURO: Alert, oriented, speech and mentation normal  PSYCH: mentation appears normal, affect and mood normal   Labs Resulted Today:   Results for orders placed or performed during the hospital encounter of 10/29/18   MA Diagnostic Left w/Fernando    Narrative    MA DIAGNOSTIC LEFT W/ FERNANDO- 10/29/2018    HISTORY:  Recalled from screening mammogram of 10/22/2018 regarding  focal asymmetry in the lower inner left breast.    COMPARISON:  Screening mammograms 10/22/2018, 10/16/2017    BREAST DENSITY: Scattered fibroglandular densities.    FINDINGS:  Diagnostic views of the left breast were obtained,  including tomosynthesis. The screening detected abnormality is not  redemonstrated. With the benefit of tomosynthesis the pattern of  glandular tissue is stable in comparison with previous. Dense benign  calcifications are likewise unchanged.      Impression    IMPRESSION: BI-RADS CATEGORY: 1 -  Negative  No evidence of malignancy. Screening detected abnormality represents  summation of benign glandular tissue. Results were communicated to the  patient during her appointment. As long as her physical examination  remains stable, annual screening mammography would be recommended.    RECOMMENDED FOLLOW-UP: Annual Mammography.    ABDULAZIZ RENE MD     ASSESSMENT/PLAN:                                                         Zora was seen today for consult.    Diagnoses and all orders for this visit:    Chronic maxillary sinusitis  -     cefuroxime (CEFTIN) 500 MG tablet; Take 1 tablet (500 mg) by mouth 2 times daily  Contact us if she fails to improve.       There are no Patient Instructions on file for this visit.    The following health maintenance items are reviewed in Epic and correct as of today:  Health Maintenance   Topic Date Due     ZOSTER IMMUNIZATION (2 of 3) 11/14/2011     MEDICARE ANNUAL WELLNESS VISIT  09/17/2016     COLONOSCOPY Q5 YR  10/29/2018     DTAP/TDAP/TD IMMUNIZATION (3 - Td) 03/17/2019     FALL RISK ASSESSMENT  09/24/2019     PHQ-2 Q1 YR  09/24/2019     ADVANCE DIRECTIVE PLANNING Q5 YRS  11/10/2020     LIPID SCREEN Q5 YR FEMALE (SYSTEM ASSIGNED)  09/24/2023     DEXA SCAN SCREENING (SYSTEM ASSIGNED)  Completed     INFLUENZA VACCINE  Completed     IPV IMMUNIZATION  Aged Out     MENINGITIS IMMUNIZATION  Aged Out       Clarisa Qureshi MD  [facility]  Family Practice  [facility]  [phone]    For any issues my office # is [phone]

## 2019-09-20 DIAGNOSIS — E78.5 HYPERLIPIDEMIA, UNSPECIFIED HYPERLIPIDEMIA TYPE: ICD-10-CM

## 2019-09-20 RX ORDER — PRAVASTATIN SODIUM 40 MG
40 TABLET ORAL AT BEDTIME
Qty: 90 TABLET | Refills: 3 | Status: SHIPPED | OUTPATIENT
Start: 2019-09-20 | End: 2020-10-05

## 2019-09-30 ENCOUNTER — OFFICE VISIT (OUTPATIENT)
Dept: FAMILY MEDICINE | Facility: CLINIC | Age: 79
End: 2019-09-30

## 2019-09-30 VITALS
SYSTOLIC BLOOD PRESSURE: 126 MMHG | HEART RATE: 65 BPM | OXYGEN SATURATION: 95 % | DIASTOLIC BLOOD PRESSURE: 82 MMHG | BODY MASS INDEX: 30.59 KG/M2 | WEIGHT: 183.8 LBS | RESPIRATION RATE: 16 BRPM

## 2019-09-30 DIAGNOSIS — E78.00 HYPERCHOLESTEROLEMIA: ICD-10-CM

## 2019-09-30 DIAGNOSIS — R73.02 GLUCOSE INTOLERANCE (IMPAIRED GLUCOSE TOLERANCE): Primary | ICD-10-CM

## 2019-09-30 DIAGNOSIS — M85.852 OSTEOPENIA OF LEFT HIP: ICD-10-CM

## 2019-09-30 PROCEDURE — 99214 OFFICE O/P EST MOD 30 MIN: CPT | Performed by: FAMILY MEDICINE

## 2019-09-30 NOTE — PROGRESS NOTES
Problem(s) Oriented visit        SUBJECTIVE:                                                    Zora Holbrook is a 79 year old female who presents to clinic today for recheck of medications. She takes pravastatin for cholesterol. She denies any muscle aches. She tries to follow a low saturated fat diet. She gets exercise with walking and stationary bike. No chest pain or pressure with exertion.     She does get left hip pain. It wakes her from sleep some nights. She will occasionally take Tylenol for this.     She has osteopenia of the left hip, approaching this on the right hip. She takes calcium and vitamin D supplement.     She has mild glucose intolerance. Due to recheck today.      Problem list, Medication list, Allergies, and Medical/Social/Surgical histories reviewed in [facility] and updated as appropriate.   Additional history: as documented    ROS:  5 point ROS completed and negative except noted above, including Gen, CV, Resp, GI, MS      Histories:   Patient Active Problem List   Diagnosis     Breast CA (H)     Hypercholesterolemia     Degenerative arthritis of lumbar spine     Hemorrhoids, external     Colon adenomas     Osteopenia     Preventative health care     Need for prophylactic vaccination and inoculation against influenza     Health Care Home     Hip pain     Advance Care Planning     Glucose intolerance (impaired glucose tolerance)     Past Surgical History:   Procedure Laterality Date     DILATION AND CURETTAGE, HYSTEROSCOPY DIAGNOSTIC, COMBINED  3/14/2014    Procedure: COMBINED DILATION AND CURETTAGE, HYSTEROSCOPY DIAGNOSTIC;   DILATION AND CURETTAGE, HYSTEROSCOPY, POLYPECTOMY ;  Surgeon: Guilalume Quintanilla MD;  Location: [facility]     LUMPECTOMY BREAST  1995    Cancer Right side     LUMPECTOMY BREAST      [facility],Left side     OVARY SURGERY      resection     Uterine cyste resection      [facility]       Social History     Tobacco Use     Smoking status: Never Smoker     Smokeless tobacco:  Never Used   Substance Use Topics     Alcohol use: No     Alcohol/week: 0.0 standard drinks     Family History   Problem Relation Age of Onset     Diabetes Father         age 60's           OBJECTIVE:                                                    /82   Pulse 65   Resp 16   Wt 83.4 kg (183 lb 12.8 oz)   SpO2 95%   BMI 30.59 kg/m    Body mass index is 30.59 kg/m .   GENERAL APPEARANCE: Alert, no acute distress  NECK: No adenopathy,masses or thyromegaly  RESP: lungs clear to auscultation   CV: normal rate, regular rhythm, no murmur or gallop  ABDOMEN: soft, no organomegaly, masses or tenderness  MS: extremities normal, no peripheral edema  NEURO: Alert, oriented, speech and mentation normal  PSYCH: mentation appears normal, affect and mood normal   Labs Resulted Today:   Results for orders placed or performed during the hospital encounter of 10/29/18   MA Diagnostic Left w/Fernando    Narrative    MA DIAGNOSTIC LEFT W/ FERNANDO- 10/29/2018    HISTORY:  Recalled from screening mammogram of 10/22/2018 regarding  focal asymmetry in the lower inner left breast.    COMPARISON:  Screening mammograms 10/22/2018, 10/16/2017    BREAST DENSITY: Scattered fibroglandular densities.    FINDINGS:  Diagnostic views of the left breast were obtained,  including tomosynthesis. The screening detected abnormality is not  redemonstrated. With the benefit of tomosynthesis the pattern of  glandular tissue is stable in comparison with previous. Dense benign  calcifications are likewise unchanged.      Impression    IMPRESSION: BI-RADS CATEGORY: 1 -  Negative  No evidence of malignancy. Screening detected abnormality represents  summation of benign glandular tissue. Results were communicated to the  patient during her appointment. As long as her physical examination  remains stable, annual screening mammography would be recommended.    RECOMMENDED FOLLOW-UP: Annual Mammography.    ABDULAZIZ RENE MD     ASSESSMENT/PLAN:                                                         Zora was seen today for recheck medication.    Diagnoses and all orders for this visit:    Glucose intolerance (impaired glucose tolerance)  -     Comp. Metabolic Panel (14) ([facility])  -     Hemoglobin A1C ([facility])  Recommend exercise, low simple sugars in diet, and modest weight loww. Recheck average blood sugar.    Osteopenia of left hip  -     Vitamin D  25-Hydroxy ([facility])  Need to have vitamin D level well within normal range, ideally 50-70. Verify today. Continue weight bearing exercise and supplements.    Hypercholesterolemia  -     Lipid Panel ([facility])  -     Comp. Metabolic Panel (14) ([facility])  Verify adequate control, continue statin.    >25 min spent with patient, greater than 50% spent on discussion/education/planning, etc. About The primary encounter diagnosis was Glucose intolerance (impaired glucose tolerance). Diagnoses of Osteopenia of left hip and Hypercholesterolemia were also pertinent to this visit.      There are no Patient Instructions on file for this visit.    The following health maintenance items are reviewed in Epic and correct as of today:  Health Maintenance   Topic Date Due     ZOSTER IMMUNIZATION (2 of 3) 11/14/2011     MEDICARE ANNUAL WELLNESS VISIT  09/17/2016     COLONOSCOPY  10/29/2018     PHQ-2  01/01/2019     DTAP/TDAP/TD IMMUNIZATION (3 - Td) 03/17/2019     INFLUENZA VACCINE (1) 09/01/2019     FALL RISK ASSESSMENT  09/24/2019     ADVANCE CARE PLANNING  11/10/2020     DEXA  Completed     PNEUMOCOCCAL IMMUNIZATION 65+ HIGH/HIGHEST RISK  Completed     IPV IMMUNIZATION  Aged Out     MENINGITIS IMMUNIZATION  Aged Out       Clarisa Qureshi MD  [facility]  Family Practice  [facility]  [phone]    For any issues my office # is [phone]

## 2019-09-30 NOTE — LETTER
[facility] [address] [place]  [address]  Phone: [phone]    October 2, 2019      Zora Holbrook  [address] [facility] [address]              Dear Zora,    The results from your recent visit showed All labs are very good. Glucose and A1c (average blood sugar) are mildly elevated, but less than before and still only in the pre-diabetes range.         Sincerely,     Clarisa Qureshi M.D.    Results for orders placed or performed in visit on 09/30/19   Lipid Panel ([facility])   Result Value Ref Range    Cholesterol 149 100 - 199 mg/dL    Triglycerides 106 0 - 149 mg/dL    HDL Cholesterol 46 >39 mg/dL    VLDL Cholesterol Adolfo 21 5 - 40 mg/dL    LDL Cholesterol Calculated 82 0 - 99 mg/dL    LDL/HDL Ratio 1.8 0.0 - 3.2 ratio    Narrative    Performed at:  01 - [facility] [address], [place], [place]  [id number]  : Reilly Baldwin MD, Phone:  [phone]   Vitamin D  25-Hydroxy ([facility])   Result Value Ref Range    Vitamin D,25-Hydroxy 57.4 30.0 - 100.0 ng/mL    Narrative    Performed at:  01 - [facility] [address], [place], [place]  [id number]  : Reilly Baldwin MD, Phone:  [phone]   Comp. Metabolic Panel (14) ([facility])   Result Value Ref Range    Glucose 106 (H) 65 - 99 mg/dL    Urea Nitrogen 17 8 - 27 mg/dL    Creatinine 0.96 0.57 - 1.00 mg/dL    eGFR If NonAfricn Am 56 (L) >59 mL/min/1.73    eGFR If Africn Am 64 >59 mL/min/1.73    BUN/Creatinine Ratio 18 12 - 28    Sodium 139 134 - 144 mmol/L    Potassium 4.1 3.5 - 5.2 mmol/L    Chloride 101 96 - 106 mmol/L    Total CO2 24 20 - 29 mmol/L    Calcium 9.2 8.7 - 10.3 mg/dL    Protein Total 6.8 6.0 - 8.5 g/dL    Albumin 4.3 3.5 - 4.7 g/dL    Globulin, Total 2.5 1.5 - 4.5 g/dL    A/G Ratio 1.7 1.2 - 2.2    Bilirubin Total 1.0 0.0 - 1.2 mg/dL    Alkaline Phosphatase 55 39 - 117 IU/L    AST 21 0 - 40 IU/L    ALT 17 0 - 32 IU/L    Narrative    Performed at:  01 - [facility]  [address]  [place], [place]  [id number]  : Reilly Baldwin MD, Phone:  [phone]   Hemoglobin A1C ([facility])   Result Value Ref Range    Hemoglobin A1C 5.8 (H) 4.8 - 5.6 %    Narrative    Performed at:  01 - [facility] [address] [place], [place]  [id number]  : Reilly Baldwin MD, Phone:  [phone]

## 2019-10-01 LAB
ALBUMIN SERPL-MCNC: 4.3 G/DL (ref 3.5–4.7)
ALBUMIN/GLOB SERPL: 1.7 {RATIO} (ref 1.2–2.2)
ALP SERPL-CCNC: 55 IU/L (ref 39–117)
ALT SERPL-CCNC: 17 IU/L (ref 0–32)
AST SERPL-CCNC: 21 IU/L (ref 0–40)
BILIRUB SERPL-MCNC: 1 MG/DL (ref 0–1.2)
BUN SERPL-MCNC: 17 MG/DL (ref 8–27)
BUN/CREATININE RATIO: 18 (ref 12–28)
CALCIUM SERPL-MCNC: 9.2 MG/DL (ref 8.7–10.3)
CHLORIDE SERPLBLD-SCNC: 101 MMOL/L (ref 96–106)
CHOLEST SERPL-MCNC: 149 MG/DL (ref 100–199)
CREAT SERPL-MCNC: 0.96 MG/DL (ref 0.57–1)
EGFR IF AFRICN AM: 64 ML/MIN/1.73
EGFR IF NONAFRICN AM: 56 ML/MIN/1.73
GLOBULIN, TOTAL: 2.5 G/DL (ref 1.5–4.5)
GLUCOSE SERPL-MCNC: 106 MG/DL (ref 65–99)
HBA1C MFR BLD: 5.8 % (ref 4.8–5.6)
HDLC SERPL-MCNC: 46 MG/DL
LDL/HDL RATIO: 1.8 RATIO (ref 0–3.2)
LDLC SERPL CALC-MCNC: 82 MG/DL (ref 0–99)
POTASSIUM SERPL-SCNC: 4.1 MMOL/L (ref 3.5–5.2)
PROT SERPL-MCNC: 6.8 G/DL (ref 6–8.5)
SODIUM SERPL-SCNC: 139 MMOL/L (ref 134–144)
TOTAL CO2: 24 MMOL/L (ref 20–29)
TRIGL SERPL-MCNC: 106 MG/DL (ref 0–149)
VITAMIN D, 25-HYDROXY: 57.4 NG/ML (ref 30–100)
VLDLC SERPL CALC-MCNC: 21 MG/DL (ref 5–40)

## 2019-10-28 ENCOUNTER — HOSPITAL ENCOUNTER (OUTPATIENT)
Dept: MAMMOGRAPHY | Facility: CLINIC | Age: 79
Discharge: HOME | End: 2019-10-28
Attending: FAMILY MEDICINE | Admitting: FAMILY MEDICINE
Payer: MEDICARE

## 2019-10-28 DIAGNOSIS — Z12.31 SCREENING MAMMOGRAM, ENCOUNTER FOR: ICD-10-CM

## 2019-10-28 PROCEDURE — 77063 BREAST TOMOSYNTHESIS BI: CPT

## 2019-12-30 ENCOUNTER — OFFICE VISIT (OUTPATIENT)
Dept: FAMILY MEDICINE | Facility: CLINIC | Age: 79
End: 2019-12-30

## 2019-12-30 VITALS
TEMPERATURE: 98.5 F | BODY MASS INDEX: 29.79 KG/M2 | RESPIRATION RATE: 16 BRPM | OXYGEN SATURATION: 94 % | DIASTOLIC BLOOD PRESSURE: 80 MMHG | WEIGHT: 179 LBS | SYSTOLIC BLOOD PRESSURE: 130 MMHG | HEART RATE: 78 BPM

## 2019-12-30 DIAGNOSIS — J02.0 STREP THROAT: Primary | ICD-10-CM

## 2019-12-30 DIAGNOSIS — R05.9 COUGH: ICD-10-CM

## 2019-12-30 LAB
FLUAV AG UPPER RESP QL IA.RAPID: NORMAL
FLUBV AG UPPER RESP QL IA.RAPID: NORMAL
S PYO AG THROAT QL IA.RAPID: NORMAL

## 2019-12-30 PROCEDURE — 87430 STREP A AG IA: CPT | Performed by: NURSE PRACTITIONER

## 2019-12-30 PROCEDURE — 99213 OFFICE O/P EST LOW 20 MIN: CPT | Mod: 25 | Performed by: NURSE PRACTITIONER

## 2019-12-30 PROCEDURE — 87804 INFLUENZA ASSAY W/OPTIC: CPT | Mod: 76 | Performed by: NURSE PRACTITIONER

## 2019-12-30 RX ORDER — ZOSTER VACCINE RECOMBINANT, ADJUVANTED 50 MCG/0.5
KIT INTRAMUSCULAR
Refills: 1 | COMMUNITY
Start: 2019-10-04 | End: 2020-10-05

## 2019-12-30 NOTE — PATIENT INSTRUCTIONS
Drink 2 liters of water per day. You can use raw honey. Elderberry can help to reduce the symptom duration and severity. Please call or return to clinic if your symptoms become worse, or if you develop any fever.     Patient Education     Adult Self-Care for Colds    Colds are caused by viruses. They can't be cured with antibiotics. However, you can ease symptoms and support your body's efforts to heal itself. No matter which symptoms you have, be sure to:    Drink plenty of fluids (water or clear soup)    Stop smoking and drinking alcohol    Get plenty of rest  Understand a fever    Take your temperature several times a day. If your fever is 100.4 F (38.0 C) for more than a day, call your healthcare provider.    Relax, lie down. Go to bed if you want. Just get off your feet and rest. Also, drink plenty of fluids to avoid dehydration.    Take acetaminophen or a nonsteroidal anti-inflammatory agent (NSAID), such as ibuprofen.  Treat a troubled nose kindly    Breathe steam or heated humidified air to open blocked nasal passages.  a hot shower or use a vaporizer. Be careful not to get burned by the steam.    Saline nasal sprays and decongestant tablets help open a stuffy nose. Antihistamines can also help, but they can cause side effects such as drowsiness and drying of the eyes, nose, and mouth.  Soothe a sore throat and cough    Gargle every 2 hours with 1/4 teaspoon of salt dissolved in 1/2 cup of warm water. Suck on throat lozenges and cough drops to moisten your throat.    Cough medicines are available but it is unclear how well they actually work.    Take acetaminophen or an NSAID, such as ibuprofen, to ease throat pain  Ease digestive problems    Put fluids back into your body. Take frequent sips of clear liquids such as water or broth. Avoid drinks that have a lot of sugar in them, such as juices and sodas. These can make diarrhea worse. Older children and adults can drink sports drinks.    As your  appetite returns, you can resume your normal diet. Ask your healthcare provider if there are any foods you should avoid.  When to seek medical care  When you first notice symptoms, ask your healthcare provider if antiviral medicines are appropriate. Antibiotics should not be taken for colds or flu. Also, call your healthcare provider if you have any of the following symptoms or if you aren't feeling better after 7 days:    Shortness of breath    Pain or pressure in the chest or belly (abdomen)    Worsening symptoms, especially after a period of improvement    Fever of 100.4 F  (38.0 C) or higher, or fever that doesn't go down with medicine    Sudden dizziness or confusion    Severe or continued vomiting    Signs of dehydration, including extreme thirst, dark urine, infrequent urination, dry mouth    Spotted, red, or very sore throat   Date Last Reviewed: 12/1/2016 2000-2018 [employer]. [address] [place] [address]. All rights reserved. This information is not intended as a substitute for professional medical care. Always follow your healthcare professional's instructions.

## 2019-12-30 NOTE — PROGRESS NOTES
Problem(s) Oriented visit        SUBJECTIVE:                                                    Zora Holbrook is a 79 year old female who presents to clinic today for the following health issues :    RESPIRATORY SYMPTOMS      Duration: about 4 weeks, started with a tickling cough, no productive with yellow mucus.     Description  sore throat and productive cough    Severity: mild    Accompanying signs and symptoms: None    History (predisposing factors):  none    Precipitating or alleviating factors: None    Therapies tried and outcome:  Acetaminophen, which has helped somewhat    Problem list, Medication list, Allergies, and Medical/Social/Surgical histories reviewed in [facility] and updated as appropriate.   Additional history: as documented    ROS:  General:  NEGATIVE for fever, chills, change in weight HEENT:  No changes in hearing or vision, no nose bleeds or other nasal problems and Negative for frequent or significant headaches CV:  NEGATIVE for chest pain, palpitations or peripheral edema, Resp: See above    Histories:   Patient Active Problem List   Diagnosis     Breast CA (H)     Hypercholesterolemia     Degenerative arthritis of lumbar spine     Hemorrhoids, external     Colon adenomas     Osteopenia     Preventative health care     Need for prophylactic vaccination and inoculation against influenza     Health Care Home     Hip pain     Advance Care Planning     Glucose intolerance (impaired glucose tolerance)     Past Surgical History:   Procedure Laterality Date     DILATION AND CURETTAGE, HYSTEROSCOPY DIAGNOSTIC, COMBINED  3/14/2014    Procedure: COMBINED DILATION AND CURETTAGE, HYSTEROSCOPY DIAGNOSTIC;   DILATION AND CURETTAGE, HYSTEROSCOPY, POLYPECTOMY ;  Surgeon: Guillaume Quintanilla MD;  Location: [facility]     LUMPECTOMY BREAST  1995    Cancer Right side     LUMPECTOMY BREAST      [facility],Left side     OVARY SURGERY      resection     Uterine cyste resection      [facility]       Social History      Tobacco Use     Smoking status: Never Smoker     Smokeless tobacco: Never Used   Substance Use Topics     Alcohol use: No     Alcohol/week: 0.0 standard drinks     Family History   Problem Relation Age of Onset     Diabetes Father         age 60's           OBJECTIVE:                                                    /80   Pulse 78   Temp 98.5  F (36.9  C)   Resp 16   Wt 81.2 kg (179 lb)   SpO2 94%   BMI 29.79 kg/m    Body mass index is 29.79 kg/m .   GENERAL: healthy, alert and no distress  EYES: Eyes grossly normal to inspection, PERRL and conjunctivae and sclerae normal  HENT: ear canals and TM's normal, nose and mouth without ulcers or lesions  RESP: lungs clear to auscultation - no rales, rhonchi or wheezes  CV: regular rate and rhythm, normal S1 S2, no S3 or S4, no murmur, click or rub, no peripheral edema and peripheral pulses strong  MS: no gross musculoskeletal defects noted, no edema  PSYCH: mentation appears normal, affect normal/bright     ASSESSMENT/PLAN:                                                        Zora was seen today for uri.    Diagnoses and all orders for this visit:    Strep throat  -     Rapid Strep ([facility])    Cough  -     Influenza Testing ([facility])    You have a viral illness, please follow the written instructions regarding cold care. Please return to clinic for any fever, or if your symptoms become worse. Please return to clinic if your symptoms do not resolve in two weeks.     ASSESSMENT/PLAN:       The following health maintenance items are reviewed in Epic and correct as of today:  Health Maintenance   Topic Date Due     MEDICARE ANNUAL WELLNESS VISIT  09/17/2016     COLONOSCOPY  10/29/2018     DTAP/TDAP/TD IMMUNIZATION (3 - Td) 03/17/2019     PHQ-2  01/01/2020     ZOSTER IMMUNIZATION (3 of 3) 11/29/2019     FALL RISK ASSESSMENT  09/30/2020     ADVANCE CARE PLANNING  11/10/2020     DEXA  Completed     INFLUENZA VACCINE  Completed     PNEUMOCOCCAL IMMUNIZATION 65+  LOW/MEDIUM RISK  Completed     IPV IMMUNIZATION  Aged Out     MENINGITIS IMMUNIZATION  Aged Out       Chary Calle NP  [facility]  [phone]    For any issues my office # is [phone]

## 2020-01-20 ENCOUNTER — ANCILLARY PROCEDURE (OUTPATIENT)
Dept: GENERAL RADIOLOGY | Facility: CLINIC | Age: 80
End: 2020-01-20
Attending: PHYSICIAN ASSISTANT
Payer: MEDICARE

## 2020-01-20 ENCOUNTER — OFFICE VISIT (OUTPATIENT)
Dept: URGENT CARE | Facility: URGENT CARE | Age: 80
End: 2020-01-20
Payer: MEDICARE

## 2020-01-20 VITALS
SYSTOLIC BLOOD PRESSURE: 132 MMHG | BODY MASS INDEX: 28.83 KG/M2 | WEIGHT: 179.4 LBS | RESPIRATION RATE: 14 BRPM | OXYGEN SATURATION: 96 % | DIASTOLIC BLOOD PRESSURE: 70 MMHG | HEIGHT: 66 IN | HEART RATE: 81 BPM | TEMPERATURE: 97.6 F

## 2020-01-20 DIAGNOSIS — R68.83 CHILLS: ICD-10-CM

## 2020-01-20 DIAGNOSIS — R09.89 CHEST CONGESTION: ICD-10-CM

## 2020-01-20 DIAGNOSIS — R05.9 COUGH: ICD-10-CM

## 2020-01-20 DIAGNOSIS — R73.02 GLUCOSE INTOLERANCE (IMPAIRED GLUCOSE TOLERANCE): Primary | ICD-10-CM

## 2020-01-20 DIAGNOSIS — R05.8 PRODUCTIVE COUGH: ICD-10-CM

## 2020-01-20 DIAGNOSIS — H92.02 LEFT EAR PAIN: ICD-10-CM

## 2020-01-20 PROCEDURE — 99203 OFFICE O/P NEW LOW 30 MIN: CPT | Performed by: PHYSICIAN ASSISTANT

## 2020-01-20 PROCEDURE — 71046 X-RAY EXAM CHEST 2 VIEWS: CPT

## 2020-01-20 RX ORDER — BENZONATATE 200 MG/1
200 CAPSULE ORAL 3 TIMES DAILY PRN
Qty: 21 CAPSULE | Refills: 0 | Status: SHIPPED | OUTPATIENT
Start: 2020-01-20 | End: 2020-10-05

## 2020-01-20 RX ORDER — AZITHROMYCIN 250 MG/1
TABLET, FILM COATED ORAL
Qty: 6 TABLET | Refills: 0 | Status: SHIPPED | OUTPATIENT
Start: 2020-01-20 | End: 2020-10-05

## 2020-01-20 ASSESSMENT — PAIN SCALES - GENERAL: PAINLEVEL: SEVERE PAIN (7)

## 2020-01-20 ASSESSMENT — MIFFLIN-ST. JEOR: SCORE: 1295.5

## 2020-01-20 NOTE — PROGRESS NOTES
"SUBJECTIVE:   Zora Holbrook is a 79 year old female presenting with a chief complaint of coughing, chest congestion .  Onset of symptoms was 2 week(s) ago.  Course of illness is worsening.    Severity moderate  Current and Associated symptoms: coughing  Treatment measures tried include OTC medications.  Predisposing factors include recent illness.    Past Medical History:   Diagnosis Date     ACP (advance care planning) 4-23-13    form given     Breast CA (H) 1995    lumpectomy and Radiation     Breast cancer (H)      Colon adenomas     Tubolar adenoma, colonoscopy, 2008     Degenerative arthritis of lumbar spine 2004    L5-S1     Hemorrhoids, external      Hypercholesterolemias      Osteopenia 2009     PONV (postoperative nausea and vomiting)      Renal disease     Stage 2        Allergies   Allergen Reactions     Nsaids      Affects kidney tests     Blue Dyes Rash     Family History   Problem Relation Age of Onset     Diabetes Father         age 60's         Social History     Tobacco Use     Smoking status: Never Smoker     Smokeless tobacco: Never Used   Substance Use Topics     Alcohol use: No     Alcohol/week: 0.0 standard drinks       ROS:  CONSTITUTIONAL:NEGATIVE for fever, chills, change in weight  INTEGUMENTARY/SKIN: NEGATIVE for worrisome rashes, moles or lesions  EYES: NEGATIVE for vision changes or irritation  ENT/MOUTH: POSITIVE for nasal congestion  RESP:POSITIVE for cough-productive  CV: NEGATIVE for chest pain, palpitations or peripheral edema  GI: NEGATIVE for nausea, abdominal pain, heartburn, or change in bowel habits  : negative for and dysuria  MUSCULOSKELETAL: NEGATIVE for significant arthralgias or myalgia  NEURO: NEGATIVE for weakness, dizziness or paresthesias    OBJECTIVE  :/70 (BP Location: Right arm, Patient Position: Sitting, Cuff Size: Adult Regular)   Pulse 81   Temp 97.6  F (36.4  C) (Oral)   Resp 14   Ht 1.676 m (5' 6\")   Wt 81.4 kg (179 lb 6.4 oz)   " LMP  (LMP Unknown)   SpO2 96%   Breastfeeding No   BMI 28.96 kg/m    GENERAL APPEARANCE: healthy, alert and no distress  EYES: EOMI,  PERRL, conjunctiva clear  HENT: ear canals and TM's normal.  Nose and mouth without ulcers, erythema or lesions  NECK: supple, nontender, no lymphadenopathy  RESP: lungs clear to auscultation - no rales, rhonchi or wheezes  CV: regular rates and rhythm, normal S1 S2, no murmur noted  ABDOMEN:  soft, nontender, no HSM or masses and bowel sounds normal  NEURO: Normal strength and tone, sensory exam grossly normal,  normal speech and mentation  SKIN: no suspicious lesions or rashes    Chest xray Negative for acute findings, read by Pacheco SADLER at time of visit.    ASSESSMENT/PLAN      ICD-10-CM    1. Glucose intolerance (impaired glucose tolerance) R73.02    2. Chest congestion R09.89 XR Chest 2 Views     azithromycin (ZITHROMAX) 250 MG tablet   3. Productive cough R05 XR Chest 2 Views     azithromycin (ZITHROMAX) 250 MG tablet   4. Left ear pain H92.02    5. Chills R68.83 XR Chest 2 Views   6. Cough R05 benzonatate (TESSALON) 200 MG capsule       Orders Placed This Encounter     XR Chest 2 Views     azithromycin (ZITHROMAX) 250 MG tablet     benzonatate (TESSALON) 200 MG capsule       zpak for chest congestion, productive cough  Tessalon for coughing  Fluids, rest  Follow up with PCP as needed  See orders in Epic

## 2020-10-05 ENCOUNTER — OFFICE VISIT (OUTPATIENT)
Dept: FAMILY MEDICINE | Facility: CLINIC | Age: 80
End: 2020-10-05

## 2020-10-05 VITALS
HEART RATE: 91 BPM | WEIGHT: 192.2 LBS | OXYGEN SATURATION: 97 % | SYSTOLIC BLOOD PRESSURE: 130 MMHG | TEMPERATURE: 97.6 F | DIASTOLIC BLOOD PRESSURE: 84 MMHG | BODY MASS INDEX: 31.02 KG/M2

## 2020-10-05 DIAGNOSIS — E66.811 OBESITY (BMI 30.0-34.9): ICD-10-CM

## 2020-10-05 DIAGNOSIS — Z00.00 ENCOUNTER FOR MEDICARE ANNUAL WELLNESS EXAM: Primary | ICD-10-CM

## 2020-10-05 DIAGNOSIS — R73.03 PREDIABETES: ICD-10-CM

## 2020-10-05 DIAGNOSIS — E78.5 HYPERLIPIDEMIA, UNSPECIFIED HYPERLIPIDEMIA TYPE: ICD-10-CM

## 2020-10-05 DIAGNOSIS — M85.852 OSTEOPENIA OF LEFT HIP: ICD-10-CM

## 2020-10-05 DIAGNOSIS — L72.3 SEBACEOUS CYST: ICD-10-CM

## 2020-10-05 DIAGNOSIS — D12.6 COLON ADENOMAS: ICD-10-CM

## 2020-10-05 DIAGNOSIS — M25.472 LEFT ANKLE SWELLING: ICD-10-CM

## 2020-10-05 PROCEDURE — G0439 PPPS, SUBSEQ VISIT: HCPCS | Performed by: NURSE PRACTITIONER

## 2020-10-05 PROCEDURE — 99213 OFFICE O/P EST LOW 20 MIN: CPT | Mod: 25 | Performed by: NURSE PRACTITIONER

## 2020-10-05 RX ORDER — PRAVASTATIN SODIUM 40 MG
40 TABLET ORAL AT BEDTIME
Qty: 90 TABLET | Refills: 3 | Status: SHIPPED | OUTPATIENT
Start: 2020-10-05 | End: 2020-10-12

## 2020-10-05 NOTE — LETTER
[facility] [address] [place]  [address]  Phone: [phone]    October 7, 2020      Zora Michelle Sheron  [address]  [facility] [address]            Dear Zora,     It was nice meeting you at your recent appointment. I have summarized your lab results below.   Your triglyceride level was slightly elevated. I recommend decreasing intake of processed foods, sugars, alcohol to lower this. Normal cholesterol levels.   Your glucose (blood sugar) was 129 and your Hemoglobin A1C = 5.9% putting you in prediabetic level still. For this we recommend lifestyle changes to include modest weight loss, regular physical activity (5 days per week at least 30 minutes per day), limiting alcohol intake, quitting smoking (if applicable) and reducing carbs/sugar in the diet to prevent progression into diabetes.     Your kidney function is stable. Normal electrolyte levels.   Please let us know if you have questions or concerns.       Sincerely,     DWAYNE Delaney CNP       Results for orders placed or performed in visit on 10/05/20   Lipid Panel ([facility])     Status: Abnormal   Result Value Ref Range    Cholesterol 154 100 - 199 mg/dL    Triglycerides 164 (H) 0 - 149 mg/dL    HDL Cholesterol 49 >39 mg/dL    VLDL Cholesterol Adolfo 28 5 - 40 mg/dL    LDL Cholesterol Calculated 77 0 - 99 mg/dL    LDL/HDL Ratio 1.6 0.0 - 3.2 ratio    Narrative    Performed at:  01 - [facility] [address], [place], [place]  [id number]  : Reilly Baldwin MD, Phone:  [phone]   Basic Metabolic Panel (8) ([facility])     Status: Abnormal   Result Value Ref Range    Glucose 129 (H) 65 - 99 mg/dL    Urea Nitrogen 20 8 - 27 mg/dL    Creatinine 0.92 0.57 - 1.00 mg/dL    eGFR If NonAfricn Am 58 (L) >59 mL/min/1.73    eGFR If Africn Am 67 >59 mL/min/1.73    BUN/Creatinine Ratio 22 12 - 28    Sodium 140 134 - 144 mmol/L    Potassium 4.4 3.5 - 5.2 mmol/L    Chloride 103 96 - 106 mmol/L    Total CO2 25 20 - 29  mmol/L    Calcium 9.5 8.7 - 10.3 mg/dL    Narrative    Performed at:  01 - [facility] [address], [place], [place]  [id number]  : Reilly Baldwin MD, Phone:  [phone]   Hemoglobin A1C ([facility])     Status: Abnormal   Result Value Ref Range    Hemoglobin A1C 5.9 (H) 4.8 - 5.6 %    Narrative    Performed at:  01 - [facility] [address], [place], [place]  [id number]  : Reilly Baldwin MD, Phone:  [phone]

## 2020-10-05 NOTE — PROGRESS NOTES
"  SUBJECTIVE:   Zora Holbrook is a 80 year old female who presents for Preventive Visit.        Patient has been advised of split billing requirements and indicates understanding: Yes  Are you in the first 12 months of your Medicare Part B coverage?  No    Physical Health:    In general, how would you rate your overall physical health? good    Outside of work, how many days during the week do you exercise? 6-7 days/week    Outside of work, approximately how many minutes a day do you exercise?15-30 minutes    If you drink alcohol do you typically have >3 drinks per day or >7 drinks per week? No    Do you usually eat at least 4 servings of fruit and vegetables a day, include whole grains & fiber and avoid regularly eating high fat or \"junk\" foods? Yes    Do you have any problems taking medications regularly?  No    Do you have any side effects from medications? muscle aches    Needs assistance for the following daily activities: transportation    Which of the following safety concerns are present in your home?  none identified     Hearing impairment: No    In the past 6 months, have you been bothered by leaking of urine? yes    Mental Health:    In general, how would you rate your overall mental or emotional health? good  PHQ-2 Score: 0    Do you feel safe in your environment? Yes    Have you ever done Advance Care Planning? (For example, a Health Directive, POLST, or a discussion with a medical provider or your loved ones about your wishes): Yes, advance care planning is on file.    Additional concerns to address?  No    Fall risk:     click delete button to remove this line now  Cognitive Screenin) Repeat 3 items (Leader, Season, Table)    2) Clock draw: NORMAL  3) 3 item recall: Recalls 3 objects  Results: 3 items recalled: COGNITIVE IMPAIRMENT LESS LIKELY    Mini-CogTM Copyright JONATHON Rainey. Licensed by the author for use in [employer]; reprinted with permission (jodie@.[facility]). All rights " reserved.      Do you have sleep apnea, excessive snoring or daytime drowsiness?: no    Left ankle swelling - started a couple of months ago. Located outside part of ankle. Does not have swelling when she wakes up in the morning but then develops through the day. No trauma/injury and area is not painful. Drinks about 2 glasses of water daily. Does eat a lot of frozen meals and convenient foods since  passed away last December. Explains she has varicose veins on left leg and thinks this may be the cause. Has not tried any treatments for symptoms.    Hypercholesteremia - taking Pravastatin 40 mg daily. Does get intermittent muscle aches, but she is unsure if this is due to the medication.  Recent Labs   Lab Test 09/30/19  1018 09/24/18  1109   CHOL 149 143   HDL 46 49   LDL 82 74   TRIG 106 101     History of colon adenomas. Last colonoscopy was in 2013 and patient was told that she did not need colonoscopies any longer.    Osteopenia - taking calcium/vitamin D supplement. Walks and gets exercise daily.    Prediabetes - no medication. Does get intermittent burning in her feet.     Bump upper left side of back. Was seen at dermatology visit last week and told not to worry about it.    Obesity - does get regular exercise but diet includes many processed foods    Got flu shot outside of clinic but unsure exact date.    Reviewed and updated as needed this visit by clinical staff  Tobacco  Allergies  Meds  Problems  Med Hx  Surg Hx  Fam Hx          Reviewed and updated as needed this visit by Provider  Tobacco  Allergies  Meds  Problems  Med Hx  Surg Hx  Fam Hx         Social History     Tobacco Use     Smoking status: Never Smoker     Smokeless tobacco: Never Used   Substance Use Topics     Alcohol use: No     Alcohol/week: 0.0 standard drinks                           Current providers sharing in care for this patient include:   Patient Care Team:  Clotilde Mcnally APRN CNP as PCP - General (Nurse  Practitioner)  Clarisa Qureshi MD as Assigned PCP    The following health maintenance items are reviewed in Epic and correct as of today:  Health Maintenance   Topic Date Due     DTAP/TDAP/TD IMMUNIZATION (3 - Td) 03/17/2019     INFLUENZA VACCINE (1) 09/01/2020     MEDICARE ANNUAL WELLNESS VISIT  10/05/2021     FALL RISK ASSESSMENT  10/05/2021     ADVANCE CARE PLANNING  10/05/2025     DEXA  Completed     PHQ-2  Completed     Pneumococcal Vaccine: 65+ Years  Completed     ZOSTER IMMUNIZATION  Completed     Pneumococcal Vaccine: Pediatrics (0 to 5 Years) and At-Risk Patients (6 to 64 Years)  Aged Out     IPV IMMUNIZATION  Aged Out     MENINGITIS IMMUNIZATION  Aged Out     HEPATITIS B IMMUNIZATION  Aged Out     COLORECTAL CANCER SCREENING  Discontinued     Lab work is in process  BP Readings from Last 3 Encounters:   10/05/20 130/84   01/20/20 132/70   12/30/19 130/80    Wt Readings from Last 3 Encounters:   10/05/20 87.2 kg (192 lb 3.2 oz)   01/20/20 81.4 kg (179 lb 6.4 oz)   12/30/19 81.2 kg (179 lb)                  Patient Active Problem List   Diagnosis     Hyperlipidemia, unspecified hyperlipidemia type     Degenerative arthritis of lumbar spine     Hemorrhoids, external     Colon adenomas     Osteopenia     Health Care Home     Hip pain     Advance Care Planning     Glucose intolerance (impaired glucose tolerance)     Sebaceous cyst     Prediabetes     Obesity (BMI 30.0-34.9)     Past Surgical History:   Procedure Laterality Date     DILATION AND CURETTAGE, HYSTEROSCOPY DIAGNOSTIC, COMBINED  3/14/2014    Procedure: COMBINED DILATION AND CURETTAGE, HYSTEROSCOPY DIAGNOSTIC;   DILATION AND CURETTAGE, HYSTEROSCOPY, POLYPECTOMY ;  Surgeon: Guillaume Quintanilla MD;  Location: [facility]     LUMPECTOMY BREAST  1995    Cancer Right side     [facility]     OVARY SURGERY      resection     Uterine cyste resection      [facility]       Social History     Tobacco Use     Smoking status:  "Never Smoker     Smokeless tobacco: Never Used   Substance Use Topics     Alcohol use: No     Alcohol/week: 0.0 standard drinks     Family History   Problem Relation Age of Onset     Diabetes Father         age 60's           ROS:  Constitutional, HEENT, cardiovascular, pulmonary, GI, , musculoskeletal, neuro, skin, endocrine and psych systems are negative, except as otherwise noted.    OBJECTIVE:   /84   Pulse 91   Temp 97.6  F (36.4  C)   Wt 87.2 kg (192 lb 3.2 oz)   LMP  (LMP Unknown)   SpO2 97%   BMI 31.02 kg/m   Estimated body mass index is 31.02 kg/m  as calculated from the following:    Height as of 1/20/20: 1.676 m (5' 6\").    Weight as of this encounter: 87.2 kg (192 lb 3.2 oz).  EXAM:   GENERAL APPEARANCE: healthy, alert and no distress  EYES: Eyes grossly normal to inspection, PERRL and conjunctivae and sclerae normal  HENT: ear canals and TM's normal, nose and mouth without ulcers or lesions, oropharynx clear and oral mucous membranes moist  NECK: no adenopathy, no asymmetry, masses, or scars and thyroid normal to palpation  RESP: lungs clear to auscultation - no rales, rhonchi or wheezes  CV: regular rates and rhythm, normal S1 S2, no S3 or S4, no murmur, click or rub and 2+ left lower extremity lateral ankle  ABDOMEN: soft, nontender, no hepatosplenomegaly, no masses and bowel sounds normal  MS: no musculoskeletal defects are noted and gait is age appropriate without ataxia  SKIN: approx 0.75 cm diameter raised skin-colored lump upper left back  NEURO: Normal strength and tone, sensory exam grossly normal, mentation intact and speech normal  PSYCH: mentation appears normal and affect normal/bright    ASSESSMENT / PLAN:   Zora was seen today for musculoskeletal problem and recheck medication.    Diagnoses and all orders for this visit:    Encounter for Medicare annual wellness exam    Left ankle swelling  -     Basic Metabolic Panel (8) ([facility])  Recommended increase water, decrease " "sodium intake. Compression stockings declined by patient. Will elevate when able. Consider vein specialist in the future    Hyperlipidemia, unspecified hyperlipidemia type  -     pravastatin (PRAVACHOL) 40 MG tablet; Take 1 tablet (40 mg) by mouth At Bedtime  Fasting lab checked & medication refilled    Osteopenia of left hip    Colon adenomas  Patient does not want colonoscopies any longer    Prediabetes  -     Hemoglobin A1C ([facility])    Sebaceous cyst  Comments:  left upper back    Obesity (BMI 30.0-34.9)    Other orders  -     Lipid Panel ([facility])        Patient has been advised of split billing requirements and indicates understanding: Yes    COUNSELING:  Reviewed preventive health counseling, as reflected in patient instructions  Special attention given to:       Regular exercise       Healthy diet/nutrition       Bladder control       Fall risk prevention       Osteoporosis Prevention/Bone Health    Estimated body mass index is 31.02 kg/m  as calculated from the following:    Height as of 1/20/20: 1.676 m (5' 6\").    Weight as of this encounter: 87.2 kg (192 lb 3.2 oz).    Weight management plan: Discussed healthy diet and exercise guidelines    She reports that she has never smoked. She has never used smokeless tobacco.    Appropriate preventive services were discussed with this patient, including applicable screening as appropriate for cardiovascular disease, diabetes, osteopenia/osteoporosis, and glaucoma.  As appropriate for age/gender, discussed screening for colorectal cancer, prostate cancer, breast cancer, and cervical cancer. Checklist reviewing preventive services available has been given to the patient.    Reviewed patients plan of care and provided an AVS. The Basic Care Plan (routine screening as documented in Health Maintenance) for Zora meets the Care Plan requirement. This Care Plan has been established and reviewed with the Patient.    Counseling Resources:  ATP IV Guidelines  Pooled " Cohorts Equation Calculator  Breast Cancer Risk Calculator  BRCA-Related Cancer Risk Assessment: FHS-7 Tool  FRAX Risk Assessment  ICSI Preventive Guidelines  Dietary Guidelines for Americans, 2010  USDA's MyPlate  ASA Prophylaxis  Lung CA Screening    DWAYNE Delaney [facility]

## 2020-10-05 NOTE — PATIENT INSTRUCTIONS
I recommend taking daily antihistamine (Loratadine/Claritin or Cetirizine/Zyrtec). Take 1 daily    For swelling - elevate leg when possible. Try to drink a couple more glasses of water daily and watch your sodium intake. Let me know if you want prescription for compression stockings. Can consider vein specialist referral in the future if symptoms worsening    Patient Education   Personalized Prevention Plan  You are due for the preventive services outlined below.  Your care team is available to assist you in scheduling these services.  If you have already completed any of these items, please share that information with your care team to update in your medical record.  Health Maintenance Due   Topic Date Due     Annual Wellness Visit  09/17/2016     Diptheria Tetanus Pertussis (DTAP/TDAP/TD) Vaccine (3 - Td) 03/17/2019     Flu Vaccine (1) 09/01/2020     FALL RISK ASSESSMENT  09/30/2020

## 2020-10-06 LAB
BUN SERPL-MCNC: 20 MG/DL (ref 8–27)
BUN/CREATININE RATIO: 22 (ref 12–28)
CALCIUM SERPL-MCNC: 9.5 MG/DL (ref 8.7–10.3)
CHLORIDE SERPLBLD-SCNC: 103 MMOL/L (ref 96–106)
CHOLEST SERPL-MCNC: 154 MG/DL (ref 100–199)
CREAT SERPL-MCNC: 0.92 MG/DL (ref 0.57–1)
EGFR IF AFRICN AM: 67 ML/MIN/1.73
EGFR IF NONAFRICN AM: 58 ML/MIN/1.73
GLUCOSE SERPL-MCNC: 129 MG/DL (ref 65–99)
HBA1C MFR BLD: 5.9 % (ref 4.8–5.6)
HDLC SERPL-MCNC: 49 MG/DL
LDL/HDL RATIO: 1.6 RATIO (ref 0–3.2)
LDLC SERPL CALC-MCNC: 77 MG/DL (ref 0–99)
POTASSIUM SERPL-SCNC: 4.4 MMOL/L (ref 3.5–5.2)
SODIUM SERPL-SCNC: 140 MMOL/L (ref 134–144)
TOTAL CO2: 25 MMOL/L (ref 20–29)
TRIGL SERPL-MCNC: 164 MG/DL (ref 0–149)
VLDLC SERPL CALC-MCNC: 28 MG/DL (ref 5–40)

## 2020-10-12 DIAGNOSIS — R60.0 LOWER EXTREMITY EDEMA: Primary | ICD-10-CM

## 2020-10-12 DIAGNOSIS — E78.5 HYPERLIPIDEMIA, UNSPECIFIED HYPERLIPIDEMIA TYPE: ICD-10-CM

## 2020-10-12 RX ORDER — PRAVASTATIN SODIUM 40 MG
40 TABLET ORAL AT BEDTIME
Qty: 90 TABLET | Refills: 3 | Status: SHIPPED | OUTPATIENT
Start: 2020-10-12 | End: 2021-10-18 | Stop reason: SINTOL

## 2020-10-12 NOTE — TELEPHONE ENCOUNTER
Called [facility] and canceled 10/5 pravastatin order. Patient informed rx will be sent to [facility].  Patient also requests order for compression stockings be mailed to her home. States discussed these at [facility] last week and patient thought had pair at home but can not find them.  Plan: okay per Clotilde Mcnally CNP for compression stocking order. Order printed and mailed to patient with DME store addresses for [facility] and [facility].  Shyanne Gonzalez RN

## 2020-11-09 ENCOUNTER — HOSPITAL ENCOUNTER (OUTPATIENT)
Dept: MAMMOGRAPHY | Facility: CLINIC | Age: 80
Discharge: HOME | End: 2020-11-09
Attending: FAMILY MEDICINE | Admitting: FAMILY MEDICINE
Payer: MEDICARE

## 2020-11-09 DIAGNOSIS — Z12.31 SCREENING MAMMOGRAM, ENCOUNTER FOR: ICD-10-CM

## 2020-11-09 PROCEDURE — 77063 BREAST TOMOSYNTHESIS BI: CPT

## 2021-03-02 DIAGNOSIS — Z23 HIGH PRIORITY FOR COVID-19 VIRUS VACCINATION: Primary | ICD-10-CM

## 2021-03-02 PROCEDURE — 0011A PR COVID VAC MODERNA 100 MCG/0.5 ML IM: CPT | Performed by: FAMILY MEDICINE

## 2021-03-02 PROCEDURE — 91301 PR COVID VAC MODERNA 100 MCG/0.5 ML IM: CPT | Performed by: FAMILY MEDICINE

## 2021-03-30 DIAGNOSIS — Z23 HIGH PRIORITY FOR COVID-19 VIRUS VACCINATION: Primary | ICD-10-CM

## 2021-03-30 PROCEDURE — 91301 PR COVID VAC MODERNA 100 MCG/0.5 ML IM: CPT | Performed by: NURSE PRACTITIONER

## 2021-03-30 PROCEDURE — 0012A PR COVID VAC MODERNA 100 MCG/0.5 ML IM: CPT | Performed by: NURSE PRACTITIONER

## 2021-10-18 ENCOUNTER — OFFICE VISIT (OUTPATIENT)
Dept: FAMILY MEDICINE | Facility: CLINIC | Age: 81
End: 2021-10-18

## 2021-10-18 VITALS
RESPIRATION RATE: 18 BRPM | HEIGHT: 66 IN | HEART RATE: 71 BPM | BODY MASS INDEX: 29.96 KG/M2 | OXYGEN SATURATION: 97 % | WEIGHT: 186.4 LBS | DIASTOLIC BLOOD PRESSURE: 86 MMHG | SYSTOLIC BLOOD PRESSURE: 138 MMHG | TEMPERATURE: 97.1 F

## 2021-10-18 DIAGNOSIS — E66.811 OBESITY (BMI 30.0-34.9): ICD-10-CM

## 2021-10-18 DIAGNOSIS — R73.03 PREDIABETES: ICD-10-CM

## 2021-10-18 DIAGNOSIS — E78.5 HYPERLIPIDEMIA, UNSPECIFIED HYPERLIPIDEMIA TYPE: Primary | ICD-10-CM

## 2021-10-18 DIAGNOSIS — R03.0 ELEVATED BP WITHOUT DIAGNOSIS OF HYPERTENSION: ICD-10-CM

## 2021-10-18 LAB
% GRANULOCYTES: 69.3 % (ref 42.2–75.2)
HBA1C MFR BLD: 5.8 % (ref 4–6)
HCT VFR BLD AUTO: 43.7 % (ref 35–46)
HEMOGLOBIN: 15.1 G/DL (ref 11.8–15.5)
LYMPHOCYTES NFR BLD AUTO: 24 % (ref 20.5–51.1)
MCH RBC QN AUTO: 30.5 PG (ref 27–31)
MCHC RBC AUTO-ENTMCNC: 34.7 G/DL (ref 33–37)
MCV RBC AUTO: 87.9 FL (ref 80–100)
MONOCYTES NFR BLD AUTO: 6.7 % (ref 1.7–9.3)
PLATELET # BLD AUTO: 163 K/UL (ref 140–450)
RBC # BLD AUTO: 4.97 X10/CMM (ref 3.7–5.2)
WBC # BLD AUTO: 5.2 X10/CMM (ref 3.8–11)

## 2021-10-18 PROCEDURE — 99214 OFFICE O/P EST MOD 30 MIN: CPT | Performed by: NURSE PRACTITIONER

## 2021-10-18 PROCEDURE — 85025 COMPLETE CBC W/AUTO DIFF WBC: CPT | Performed by: NURSE PRACTITIONER

## 2021-10-18 PROCEDURE — 83036 HEMOGLOBIN GLYCOSYLATED A1C: CPT | Performed by: NURSE PRACTITIONER

## 2021-10-18 PROCEDURE — 36415 COLL VENOUS BLD VENIPUNCTURE: CPT | Performed by: NURSE PRACTITIONER

## 2021-10-18 RX ORDER — SIMVASTATIN 20 MG
20 TABLET ORAL AT BEDTIME
Qty: 90 TABLET | Refills: 3 | Status: SHIPPED | OUTPATIENT
Start: 2021-10-18 | End: 2022-10-13

## 2021-10-18 ASSESSMENT — MIFFLIN-ST. JEOR: SCORE: 1317.25

## 2021-10-18 NOTE — PROGRESS NOTES
"Problem(s) Oriented visit        SUBJECTIVE:                                                    Zora Holbrook is a 81 year old female who presents to clinic today for the following health issues :    Hyperlipidemia - Currently taking Pravastatin 40 mg daily. Would like to switch back to Simvastatin 20 mg daily since having dry mouth she associates with Pravastatin and her muscle cramps have not improved.   Recent Labs   Lab Test 10/05/20  0930 09/30/19  1018   CHOL 154 149   HDL 49 46   LDL 77 82   TRIG 164* 106     Prediabetes - not on medication for this    Obesity - has lost some weight and states that it is lower at home  Wt Readings from Last 5 Encounters:   10/18/21 84.6 kg (186 lb 6.4 oz)   10/05/20 87.2 kg (192 lb 3.2 oz)   01/20/20 81.4 kg (179 lb 6.4 oz)   12/30/19 81.2 kg (179 lb)   09/30/19 83.4 kg (183 lb 12.8 oz)     Elevated blood pressure - not on antihypertensive medication. Denies headaches, chest pain, pressure, palpitations, shortness of breath.  BP Readings from Last 3 Encounters:   10/18/21 138/86   10/05/20 130/84   01/20/20 132/70     Problem list, Medication list, Allergies, and Medical/Social/Surgical histories reviewed in [facility] and updated as appropriate.   Additional history: as documented    ROS:  5 point ROS completed and negative except noted above, including Gen, CV, Resp, GI, MS    OBJECTIVE:                                                    /86 (BP Location: Left arm, Patient Position: Sitting, Cuff Size: Adult Large)   Pulse 71   Temp 97.1  F (36.2  C) (Temporal)   Resp 18   Ht 1.676 m (5' 6\")   Wt 84.6 kg (186 lb 6.4 oz)   LMP  (LMP Unknown)   SpO2 97%   BMI 30.09 kg/m    Body mass index is 30.09 kg/m .   GENERAL: healthy, alert and no distress  RESP: lungs clear to auscultation - no rales, rhonchi or wheezes  CV: regular rates and rhythm, normal S1 S2, no S3 or S4 and no murmur, click or rub  MS: extremities normal- no gross deformities noted  PSYCH: normal " "affect & mood     ASSESSMENT/PLAN:                                                      BP Screening:   Last 3 BP Readings:    BP Readings from Last 3 Encounters:   10/18/21 138/86   10/05/20 130/84   01/20/20 132/70       The following was recommended to the patient:  Re-screen BP within a year and recommended lifestyle modifications    BMI:   Estimated body mass index is 30.09 kg/m  as calculated from the following:    Height as of this encounter: 1.676 m (5' 6\").    Weight as of this encounter: 84.6 kg (186 lb 6.4 oz).   Weight management plan: Discussed healthy diet and exercise guidelines      Zora was seen today for recheck medication.    Diagnoses and all orders for this visit:    Hyperlipidemia, unspecified hyperlipidemia type  -     Lipid Panel ([facility])  -     simvastatin (ZOCOR) 20 MG tablet; Take 1 tablet (20 mg) by mouth At Bedtime  -     VENOUS COLLECTION  Switching back to Simvastatin 20 mg daily per patient preference. Recommend increasing water intake and trying Magnesium supplement at bedtime to help with cramps. Lifestyle modifications discussed    Prediabetes  -     Comp. Metabolic Panel (14) ([facility])  -     Cancel: Hemoglobin A1C ([facility])  -     VENOUS COLLECTION  -     Hemoglobin A1C ([facility])  A1C stable (5.8%). Reviewed the labs showing mildly elevated glucose levels consistent with prediabetes.     Discussed \"pre-diabetes\", impaired glucose tolerance, and its part in the dysmetabolic syndrome.   No medications needed at this time.     Discussed the inevitable progression of impaired glucose tolerance toward worsening diabetes mellitus if nothing is changed in the diet, and the need for agressive interventions now to delay and prevent this inevitable progression.    Recommended lower carb diet.  Recommended regular physical activity.   We will continue to monitor this and pabon additional recommendations and treatments as indicated based on the labs.      Obesity (BMI 30.0-34.9)  -     CBC with " Diff/Plt ([facility])  -     VENOUS COLLECTION  Applauded weight loss. Discussed diet & exercise.    Elevated BP without diagnosis of hypertension  Lifestyle modifications for decreasing blood pressure discussed. Goal of < 130/80 recommended      See Patient Instructions  Patient Instructions   Blood pressure slightly elevated today. Goal < 130/80    We will notify you with lab results    Thank you for coming in today!     We are working to improve our digital reputation.  Would you please help us by reviewing our clinic on Google and/or Facebook?  These are links for filling out a review for the clinic:    [email or web address]                 [email or web address]    We truly appreciate you taking the time to do this.     General Information:    Today you had your appointment with Clotilde Mcnally CNP  My hours are:    Monday 8 AM - 5 PM  Wednesday: 8 AM - 5 PM  Thursday: 8 AM - 5 PM  Fridays: 8 AM - 5 PM    I am not in the office Tuesdays. Therefore non urgent calls received on Tuesday will be addressed when I am back in the office on Wednesday.     If lab work was done today as part of your evaluation you will generally be contacted via My Chart, mail, or phone with the results within 1-5 days. If there is an alarming result we will contact you by phone. Lab results come back at varying times, I generally wait until all labs are resulted before making comments on results. Please note labs are automatically released to My Chart once available.     If you need refills please contact your pharmacy They will send a refill request to me to review. Please allow 3 business days for us to process all refill requests.     Please call or send a medical message with any questions or concerns        DWAYNE Delaney CNP  [facility]  Family Practice  [facility]  [phone]    For any issues my office # is [phone]

## 2021-10-18 NOTE — LETTER
[facility] [address] [place]  [address]  Phone: [phone]    October 21, 2021      Zora Holbrook  [address] [address]              Dear Zora,     It was ice to see you at your recent appointment!   I have summarized your lab results below.   Normal cholesterol & triglyceride levels.   Glucose (blood sugar)  = 111 and Hemoglobin A1C = 5.8% putting you in prediabetic range. This has been very stable over past 5 years.   Normal complete blood count which includes a white count (WBC), hemoglobin (hgb), and platelets.   Normal kidney and liver function and electrolytes (complete metabolic panel)     Please let us know if you have questions or concerns.     Sincerely,   DWAYNE Delaney CNP/bmb       Results for orders placed or performed in visit on 10/18/21   Lipid Panel ([facility])     Status: None   Result Value Ref Range    Cholesterol 152 100 - 199 mg/dL    Triglycerides 125 0 - 149 mg/dL    HDL Cholesterol 54 >39 mg/dL    VLDL Cholesterol Adolfo 22 5 - 40 mg/dL    LDL Cholesterol Calculated 76 0 - 99 mg/dL    LDL/HDL Ratio 1.4 0.0 - 3.2 ratio    Narrative    Performed at:  01 - [facility] [address], [place], [place]  [id number]  : Reilly Baldwin MD, Phone:  [phone]   Comp. Metabolic Panel (14) ([facility])     Status: Abnormal   Result Value Ref Range    Glucose 111 (H) 65 - 99 mg/dL    Urea Nitrogen 19 8 - 27 mg/dL    Creatinine 0.87 0.57 - 1.00 mg/dL    eGFR If NonAfricn Am 62 >59 mL/min/1.73    eGFR If Africn Am 71 >59 mL/min/1.73    BUN/Creatinine Ratio 22 12 - 28    Sodium 142 134 - 144 mmol/L    Potassium 4.4 3.5 - 5.2 mmol/L    Chloride 101 96 - 106 mmol/L    Total CO2 23 20 - 29 mmol/L    Calcium 9.4 8.7 - 10.3 mg/dL    Protein Total 7.0 6.0 - 8.5 g/dL    Albumin 4.6 3.6 - 4.6 g/dL    Globulin, Total 2.4 1.5 - 4.5 g/dL    A/G Ratio 1.9 1.2 - 2.2    Bilirubin Total 0.8 0.0 - 1.2 mg/dL    Alkaline Phosphatase 55 44 - 121 IU/L     AST 18 0 - 40 IU/L    ALT 21 0 - 32 IU/L    Narrative    Performed at:  01 - [facility] [address], [place], [place]  [id number]  : Reilly Baldwin MD, Phone:  [phone]   CBC with Diff/Plt ([facility])     Status: None   Result Value Ref Range    WBC x10/cmm 5.2 3.8 - 11.0 x10/cmm    % Lymphocytes 24.0 20.5 - 51.1 %    % Monocytes 6.7 1.7 - 9.3 %    % Granulocytes 69.3 42.2 - 75.2 %    RBC x10/cmm 4.97 3.7 - 5.2 x10/cmm    Hemoglobin 15.1 11.8 - 15.5 g/dl    Hematocrit 43.7 35 - 46 %    MCV 87.9 80 - 100 fL    MCH 30.5 27.0 - 31.0 pg    MCHC 34.7 33.0 - 37.0 g/dL    Platelet Count 163 140 - 450 K/uL   Hemoglobin A1C ([facility])     Status: None   Result Value Ref Range    Hemoglobin A1C 5.8 4.0 - 6.0 %

## 2021-10-18 NOTE — PATIENT INSTRUCTIONS
Blood pressure slightly elevated today. Goal < 130/80    We will notify you with lab results    Thank you for coming in today!     We are working to improve our digital reputation.  Would you please help us by reviewing our clinic on Google and/or Facebook?  These are links for filling out a review for the clinic:    [email or web address]                 [email or web address]    We truly appreciate you taking the time to do this.     General Information:    Today you had your appointment with Clotilde Mcnally CNP  My hours are:    Monday 8 AM - 5 PM  Wednesday: 8 AM - 5 PM  Thursday: 8 AM - 5 PM  Fridays: 8 AM - 5 PM    I am not in the office Tuesdays. Therefore non urgent calls received on Tuesday will be addressed when I am back in the office on Wednesday.     If lab work was done today as part of your evaluation you will generally be contacted via My Chart, mail, or phone with the results within 1-5 days. If there is an alarming result we will contact you by phone. Lab results come back at varying times, I generally wait until all labs are resulted before making comments on results. Please note labs are automatically released to My Chart once available.     If you need refills please contact your pharmacy They will send a refill request to me to review. Please allow 3 business days for us to process all refill requests.     Please call or send a medical message with any questions or concerns

## 2021-10-19 LAB
ALBUMIN SERPL-MCNC: 4.6 G/DL (ref 3.6–4.6)
ALBUMIN/GLOB SERPL: 1.9 {RATIO} (ref 1.2–2.2)
ALP SERPL-CCNC: 55 IU/L (ref 44–121)
ALT SERPL-CCNC: 21 IU/L (ref 0–32)
AST SERPL-CCNC: 18 IU/L (ref 0–40)
BILIRUB SERPL-MCNC: 0.8 MG/DL (ref 0–1.2)
BUN SERPL-MCNC: 19 MG/DL (ref 8–27)
BUN/CREATININE RATIO: 22 (ref 12–28)
CALCIUM SERPL-MCNC: 9.4 MG/DL (ref 8.7–10.3)
CHLORIDE SERPLBLD-SCNC: 101 MMOL/L (ref 96–106)
CHOLEST SERPL-MCNC: 152 MG/DL (ref 100–199)
CREAT SERPL-MCNC: 0.87 MG/DL (ref 0.57–1)
EGFR IF AFRICN AM: 71 ML/MIN/1.73
EGFR IF NONAFRICN AM: 62 ML/MIN/1.73
GLOBULIN, TOTAL: 2.4 G/DL (ref 1.5–4.5)
GLUCOSE SERPL-MCNC: 111 MG/DL (ref 65–99)
HDLC SERPL-MCNC: 54 MG/DL
LDL/HDL RATIO: 1.4 RATIO (ref 0–3.2)
LDLC SERPL CALC-MCNC: 76 MG/DL (ref 0–99)
POTASSIUM SERPL-SCNC: 4.4 MMOL/L (ref 3.5–5.2)
PROT SERPL-MCNC: 7 G/DL (ref 6–8.5)
SODIUM SERPL-SCNC: 142 MMOL/L (ref 134–144)
TOTAL CO2: 23 MMOL/L (ref 20–29)
TRIGL SERPL-MCNC: 125 MG/DL (ref 0–149)
VLDLC SERPL CALC-MCNC: 22 MG/DL (ref 5–40)

## 2021-11-22 ENCOUNTER — HOSPITAL ENCOUNTER (OUTPATIENT)
Dept: MAMMOGRAPHY | Facility: CLINIC | Age: 81
Discharge: HOME | End: 2021-11-22
Attending: NURSE PRACTITIONER | Admitting: NURSE PRACTITIONER
Payer: MEDICARE

## 2021-11-22 DIAGNOSIS — Z12.31 VISIT FOR SCREENING MAMMOGRAM: ICD-10-CM

## 2021-11-22 PROCEDURE — 77063 BREAST TOMOSYNTHESIS BI: CPT

## 2022-02-21 ENCOUNTER — OFFICE VISIT (OUTPATIENT)
Dept: FAMILY MEDICINE | Facility: CLINIC | Age: 82
End: 2022-02-21

## 2022-02-21 VITALS
DIASTOLIC BLOOD PRESSURE: 82 MMHG | RESPIRATION RATE: 18 BRPM | BODY MASS INDEX: 29.09 KG/M2 | HEART RATE: 83 BPM | SYSTOLIC BLOOD PRESSURE: 136 MMHG | WEIGHT: 181 LBS | HEIGHT: 66 IN | OXYGEN SATURATION: 98 %

## 2022-02-21 DIAGNOSIS — R03.0 ELEVATED BP WITHOUT DIAGNOSIS OF HYPERTENSION: ICD-10-CM

## 2022-02-21 DIAGNOSIS — S46.811A TRAPEZIUS STRAIN, RIGHT, INITIAL ENCOUNTER: Primary | ICD-10-CM

## 2022-02-21 PROCEDURE — 99213 OFFICE O/P EST LOW 20 MIN: CPT | Performed by: FAMILY MEDICINE

## 2022-02-21 NOTE — PROGRESS NOTES
"  Fanny Blakely is a 81 year old patient who presents to clinic for evaluation.  Reports right sided neck pain for 1-2 weeks.  It is lateral/posterior.  No numbness, tingling, weakness.  No fever or chills.  No injury or trauma.  She was concerned about stroke but denies unilateral weakness, visual changes, dysarthria, dysphagia or dizziness.        Review of Systems   Constitutional, HEENT, cardiovascular, pulmonary, GI, , musculoskeletal, neuro, skin, endocrine and psych systems are negative, except as otherwise noted.      Objective    /82   Pulse 83   Resp 18   Ht 1.676 m (5' 6\")   Wt 82.1 kg (181 lb)   LMP  (LMP Unknown)   SpO2 98%   BMI 29.21 kg/m      General: Well appearing, NAD  MSK: kyphotic posture.  No midline tenderness.  Neck ROM limited in right rotation.  Normal strength and sensation.  Normal UE strength.  Tenderness over right trapezius with spasm.  Psych: normal mood and affect  Neuro: CN 2-12 intact. No focal deficits      Trapezius strain, right, initial encounter  Reassurance.  Handout given.  Gentle stretching, acetaminophen, heat and follow up if not improving.    Elevated BP without diagnosis of hypertension  Normal on recheck.  Continue to monitor.              "

## 2022-05-23 ENCOUNTER — TRANSFERRED RECORDS (OUTPATIENT)
Dept: FAMILY MEDICINE | Facility: CLINIC | Age: 82
End: 2022-05-23

## 2022-07-21 ENCOUNTER — TRANSFERRED RECORDS (OUTPATIENT)
Dept: FAMILY MEDICINE | Facility: CLINIC | Age: 82
End: 2022-07-21

## 2022-09-19 ENCOUNTER — TRANSFERRED RECORDS (OUTPATIENT)
Dept: FAMILY MEDICINE | Facility: CLINIC | Age: 82
End: 2022-09-19

## 2022-10-13 DIAGNOSIS — E78.5 HYPERLIPIDEMIA, UNSPECIFIED HYPERLIPIDEMIA TYPE: ICD-10-CM

## 2022-10-13 RX ORDER — SIMVASTATIN 20 MG
20 TABLET ORAL AT BEDTIME
Qty: 90 TABLET | Refills: 3 | Status: SHIPPED | OUTPATIENT
Start: 2022-10-13 | End: 2023-10-02

## 2022-10-13 NOTE — TELEPHONE ENCOUNTER
Simvastatin  LOV 10/18/21  Next appointment 10/24/22  Hyperlipidemia, unspecified hyperlipidemia type  -     Lipid Panel ([facility])  -     simvastatin (ZOCOR) 20 MG tablet; Take 1 tablet (20 mg) by mouth At Bedtime  -     VENOUS COLLECTION  Switching back to Simvastatin 20 mg daily per patient preference. Recommend increasing water intake and trying Magnesium supplement at bedtime to help with cramps. Lifestyle modifications discussed  Component      Latest Ref Rng & Units 10/18/2021   Glucose      65 - 99 mg/dL 111 (H)   Urea Nitrogen      8 - 27 mg/dL 19   Creatinine      0.57 - 1.00 mg/dL 0.87   eGFR If NonAfricn Am      >59 mL/min/1.73 62   eGFR If Africn Am      >59 mL/min/1.73 71   BUN/Creatinine Ratio      12 - 28 22   Sodium      134 - 144 mmol/L 142   Potassium      3.5 - 5.2 mmol/L 4.4   Chloride      96 - 106 mmol/L 101   Total CO2      20 - 29 mmol/L 23   Calcium      8.7 - 10.3 mg/dL 9.4   Protein Total      6.0 - 8.5 g/dL 7.0   Albumin      3.6 - 4.6 g/dL 4.6   Globulin, Total      1.5 - 4.5 g/dL 2.4   A/G Ratio      1.2 - 2.2 1.9   Bilirubin Total      0.0 - 1.2 mg/dL 0.8   Alkaline Phosphatase      44 - 121 IU/L 55   AST      0 - 40 IU/L 18   ALT      0 - 32 IU/L 21   WBC      3.8 - 11.0 x10/cmm 5.2   % Lymphocytes      20.5 - 51.1 % 24.0   % Monocytes      1.7 - 9.3 % 6.7   % Granulocytes      42.2 - 75.2 % 69.3   RBC x10/cmm      3.7 - 5.2 x10/cmm 4.97   Hemoglobin      11.8 - 15.5 g/dl 15.1   Hematocrit      35 - 46 % 43.7   MCV      80 - 100 fL 87.9   MCH      27.0 - 31.0 pg 30.5   MCHC      33.0 - 37.0 g/dL 34.7   Platelet Count      140 - 450 K/uL 163   Cholesterol      100 - 199 mg/dL 152   Triglycerides      0 - 149 mg/dL 125   HDL Cholesterol      >39 mg/dL 54   VLDL Cholesterol Adolfo      5 - 40 mg/dL 22   LDL Cholesterol Calculated      0 - 99 mg/dL 76   LDL/HDL Ratio      0.0 - 3.2 ratio 1.4

## 2022-10-24 ENCOUNTER — OFFICE VISIT (OUTPATIENT)
Dept: FAMILY MEDICINE | Facility: CLINIC | Age: 82
End: 2022-10-24

## 2022-10-24 VITALS
HEART RATE: 72 BPM | HEIGHT: 66 IN | BODY MASS INDEX: 30.56 KG/M2 | WEIGHT: 190.13 LBS | DIASTOLIC BLOOD PRESSURE: 70 MMHG | SYSTOLIC BLOOD PRESSURE: 122 MMHG | RESPIRATION RATE: 16 BRPM | OXYGEN SATURATION: 96 %

## 2022-10-24 DIAGNOSIS — Z00.00 ENCOUNTER FOR MEDICARE ANNUAL WELLNESS EXAM: Primary | ICD-10-CM

## 2022-10-24 DIAGNOSIS — E66.811 OBESITY (BMI 30.0-34.9): ICD-10-CM

## 2022-10-24 DIAGNOSIS — M85.852 OSTEOPENIA OF BOTH HIPS: ICD-10-CM

## 2022-10-24 DIAGNOSIS — N18.31 CHRONIC KIDNEY DISEASE, STAGE 3A (H): ICD-10-CM

## 2022-10-24 DIAGNOSIS — M85.851 OSTEOPENIA OF BOTH HIPS: ICD-10-CM

## 2022-10-24 DIAGNOSIS — E78.5 HYPERLIPIDEMIA, UNSPECIFIED HYPERLIPIDEMIA TYPE: ICD-10-CM

## 2022-10-24 DIAGNOSIS — R73.03 PREDIABETES: ICD-10-CM

## 2022-10-24 PROBLEM — R03.0 ELEVATED BP WITHOUT DIAGNOSIS OF HYPERTENSION: Status: RESOLVED | Noted: 2021-10-18 | Resolved: 2022-10-24

## 2022-10-24 LAB
% GRANULOCYTES: 67.7 % (ref 42.2–75.2)
CHOL/HDL RATIO (RMG): 2.9 MG/DL (ref 0–4.5)
CHOLESTEROL: 125 MG/DL (ref 100–199)
HCT VFR BLD AUTO: 43.6 % (ref 35–46)
HDL (RMG): 43 MG/DL (ref 40–?)
HEMOGLOBIN: 14.7 G/DL (ref 11.8–15.5)
LDL CALCULATED (RMG): 60 MG/DL (ref 0–130)
LYMPHOCYTES NFR BLD AUTO: 23.8 % (ref 20.5–51.1)
MCH RBC QN AUTO: 30.6 PG (ref 27–31)
MCHC RBC AUTO-ENTMCNC: 33.7 G/DL (ref 33–37)
MCV RBC AUTO: 90.8 FL (ref 80–100)
MONOCYTES NFR BLD AUTO: 8.5 % (ref 1.7–9.3)
PLATELET # BLD AUTO: 163 K/UL (ref 140–450)
RBC # BLD AUTO: 4.8 X10/CMM (ref 3.7–5.2)
TRIGLYCERIDES (RMG): 108 MG/DL (ref 0–149)
WBC # BLD AUTO: 5.3 X10/CMM (ref 3.8–11)

## 2022-10-24 PROCEDURE — 36415 COLL VENOUS BLD VENIPUNCTURE: CPT | Performed by: NURSE PRACTITIONER

## 2022-10-24 PROCEDURE — 99213 OFFICE O/P EST LOW 20 MIN: CPT | Mod: 25 | Performed by: NURSE PRACTITIONER

## 2022-10-24 PROCEDURE — 85025 COMPLETE CBC W/AUTO DIFF WBC: CPT | Performed by: NURSE PRACTITIONER

## 2022-10-24 PROCEDURE — G0439 PPPS, SUBSEQ VISIT: HCPCS | Performed by: NURSE PRACTITIONER

## 2022-10-24 PROCEDURE — 80061 LIPID PANEL: CPT | Mod: QW | Performed by: NURSE PRACTITIONER

## 2022-10-24 NOTE — LETTER
[facility]  [address] [place]  [address]  Phone: [phone]    October 31, 2022      Zora Holbrook  [address]  [facility] [address] [address]              Dear Zora,     It was so nice to see you at your recent appointment!   I have summarized your lab results below.   Your blood sugar was 104 & Hemoglobin A1C = 5.9% putting you in prediabets range but stable for the last 6 years. Continue egular physical activity (5 days per week at least 30 minutes per day), limiting alcohol intake, and reducing carbs/sugar in the diet to prevent progression into diabetes.   Normal kidney and liver function and electrolytes (complete metabolic panel)   Cholesterol & triglyceride levels look great! Continue current dose of Simvastatin.   Normal complete blood count which includes a white count (WBC), hemoglobin (hgb), and platelets.       Please let us know if you have questions or concerns.     Sincerely,   DWAYNE Delaney CNP       Results for orders placed or performed in visit on 10/24/22   Lipid Profile ([facility])     Status: None   Result Value Ref Range    CHOLESTEROL 125 100 - 199 mg/dl    HDL 43 40 mg/dl    TRIGLYCERIDES ([facility]) 108 0 - 149 mg/dl    LDL CALCULATED ([facility]) 60 0 - 130 mg/dl    CHOL/HDL RATIO ([facility]) 2.9 0.0 - 4.5 mg/dl   Comp. Metabolic Panel (14) ([facility])     Status: Abnormal   Result Value Ref Range    Glucose 104 (H) 70 - 99 mg/dL    Urea Nitrogen 20 8 - 27 mg/dL    Creatinine 0.90 0.57 - 1.00 mg/dL    eGFR  63 >59 mL/min/1.73    BUN/Creatinine Ratio 22 12 - 28    Sodium 140 134 - 144 mmol/L    Potassium 4.5 3.5 - 5.2 mmol/L    Chloride 100 96 - 106 mmol/L    Total CO2 24 20 - 29 mmol/L    Calcium 9.4 8.7 - 10.3 mg/dL    Protein Total 6.6 6.0 - 8.5 g/dL    Albumin 4.6 3.6 - 4.6 g/dL    Globulin, Total 2.0 1.5 - 4.5 g/dL    A/G Ratio 2.3 (H) 1.2 - 2.2    Bilirubin Total 1.0 0.0 - 1.2 mg/dL    Alkaline Phosphatase 56 44 - 121 IU/L    AST 15 0 - 40 IU/L    ALT 17 0 - 32 IU/L     Narrative    Performed at:  01 - [facility] [address], [place], [place]  [id number]  : Reilly Baldwin MD, Phone:  [phone]   CBC with Diff/Plt ([facility])     Status: None   Result Value Ref Range    WBC x10/cmm 5.3 3.8 - 11.0 x10/cmm    % Lymphocytes 23.8 20.5 - 51.1 %    % Monocytes 8.5 1.7 - 9.3 %    % Granulocytes 67.7 42.2 - 75.2 %    RBC x10/cmm 4.80 3.7 - 5.2 x10/cmm    Hemoglobin 14.7 11.8 - 15.5 g/dl    Hematocrit 43.6 35 - 46 %    MCV 90.8 80 - 100 fL    MCH 30.6 27.0 - 31.0 pg    MCHC 33.7 33.0 - 37.0 g/dL    Platelet Count 163 140 - 450 K/uL   Hemoglobin A1C ([facility])     Status: Abnormal   Result Value Ref Range    Hemoglobin A1C 5.9 (H) 4.8 - 5.6 %    Narrative    Performed at:  01 - [facility] [address], [place], [place]  [id number]  : Reilly Baldwin MD, Phone:  [phone]

## 2022-10-24 NOTE — PATIENT INSTRUCTIONS
Preventive Health Recommendations    See your health care provider every year to  Review health changes.   Discuss preventive care.    Review your medicines if your doctor has prescribed any.    You no longer need a yearly Pap test unless you've had an abnormal Pap test in the past 10 years. If you have vaginal symptoms, such as bleeding or discharge, be sure to talk with your provider about a Pap test.    Every 1 to 2 years, have a mammogram.  If you are over 69, talk with your health care provider about whether or not you want to continue having screening mammograms.    Every 10 years, have a colonoscopy. Or, have a yearly FIT test (stool test). These exams will check for colon cancer.     Have a cholesterol test every 5 years, or more often if your doctor advises it.     Have a diabetes test (fasting glucose) every three years. If you are at risk for diabetes, you should have this test more often.     At age 65, have a bone density scan (DEXA) to check for osteoporosis (brittle bone disease).    Shots:  Get a flu shot each year.  Get a tetanus shot every 10 years.  Talk to your doctor about your pneumonia vaccines. There are now two you should receive - Pneumovax (PPSV 23) and Prevnar (PCV 13).  Talk to your pharmacist about the shingles vaccine.  Talk to your doctor about the hepatitis B vaccine.    Nutrition:   Eat at least 5 servings of fruits and vegetables each day.    Eat whole-grain bread, whole-wheat pasta and brown rice instead of white grains and rice.    Get adequate about Calcium and Vitamin D.     Lifestyle  Exercise at least 150 minutes a week (30 minutes a day, 5 days a week). This will help you control your weight and prevent disease.    Limit alcohol to one drink per day.    No smoking.     Wear sunscreen to prevent skin cancer.     See your dentist twice a year for an exam and cleaning.    See your eye doctor every 1 to 2 years to screen for conditions such as glaucoma, macular degeneration,  cataracts, etc.    Personalized Prevention Plan  You are due for the preventive services outlined below.  Your care team is available to assist you in scheduling these services.  If you have already completed any of these items, please share that information with your care team to update in your medical record.    Health Maintenance Due   Topic Date Due    Hepatitis B Vaccine (1 of 3 - 3-dose series) Never done    Diptheria Tetanus Pertussis (DTAP/TDAP/TD) Vaccine (3 - Td or Tdap) 03/17/2019    COVID-19 Vaccine (5 - Booster for Moderna series) 06/19/2022

## 2022-10-24 NOTE — PROGRESS NOTES
"  SUBJECTIVE:   Zora Holbrook is a 82 year old female who presents for Preventive Visit.    Hyperlipidemia - Taking Simvastatin 20 mg daily with occasional lower leg cramps primarily at night.     Prediabetes - not on medication    CKD stage 3a - avoiding NSAIDs, not on ACE/ARB    Osteopenia bilateral hips - refuses further dexa scans    Are you in the first 12 months of your Medicare Part B coverage?  No    Physical Health:    In general, how would you rate your overall physical health? good    Outside of work, how many days during the week do you exercise? 2-3 days/week    Outside of work, approximately how many minutes a day do you exercise?15-30 minutes    If you drink alcohol do you typically have >3 drinks per day or >7 drinks per week? No    Do you usually eat at least 4 servings of fruit and vegetables a day, include whole grains & fiber and avoid regularly eating high fat or \"junk\" foods? Yes    Do you have any problems taking medications regularly?  No    Do you have any side effects from medications? none    Needs assistance for the following daily activities: no assistance needed    Which of the following safety concerns are present in your home?  none identified     Hearing impairment: No  Hearing Screening:  Right Ear  4000Hz: fail  2000Hz: fail  1000Hz: pass  500Hz: pass    Left Ear  4000Hz: pass  2000Hz: pass  1000Hz: pass    500Hz: pass    In the past 6 months, have you been bothered by leaking of urine? yes    Mental Health:    In general, how would you rate your overall mental or emotional health? good  PHQ-2 Score:      PHQ-2 ( 1999 [employer]) 2/21/2022 10/18/2021   Q1: Little interest or pleasure in doing things 0 0   Q2: Feeling down, depressed or hopeless 0 0   PHQ-2 Score 0 0   PHQ-2 Total Score (12-17 Years)- Positive if 3 or more points; Administer PHQ-A if positive - 0      Do you feel safe in your environment? Yes    Have you ever done Advance Care Planning? (For example, a Health " Directive, POLST, or a discussion with a medical provider or your loved ones about your wishes): Yes, advance care planning is on file.    Fall risk:  Fallen 2 or more times in the past year?: No  Any fall with injury in the past year?: No    Cognitive Screenin) Repeat 3 items (Leader, Season, Table)    2) Clock draw: NORMAL  3) 3 item recall: Recalls 3 objects  Results: 3 items recalled: COGNITIVE IMPAIRMENT LESS LIKELY    Mini-CogTM Copyright JONATHON Rainey. Licensed by the author for use in [facility]; reprinted with permission (jodie@[facility]). All rights reserved.      Do you have sleep apnea, excessive snoring or daytime drowsiness?: no            Reviewed and updated as needed this visit by clinical staff   Tobacco  Allergies  Meds  Problems  Med Hx  Surg Hx  Fam Hx          Reviewed and updated as needed this visit by Provider   Tobacco  Allergies  Meds  Problems  Med Hx  Surg Hx  Fam Hx         Social History     Tobacco Use     Smoking status: Never     Smokeless tobacco: Never   Substance Use Topics     Alcohol use: No     Alcohol/week: 0.0 standard drinks                           Current providers sharing in care for this patient include:   Patient Care Team:  Clotilde Mcnally APRN CNP as PCP - General (Nurse Practitioner)  Clotilde Mcnally APRN CNP as Assigned PCP    The following health maintenance items are reviewed in Epic and correct as of today:  Health Maintenance   Topic Date Due     HEPATITIS B IMMUNIZATION (1 of 3 - 3-dose series) Never done     DTAP/TDAP/TD IMMUNIZATION (3 - Td or Tdap) 2019     COVID-19 Vaccine (5 - Booster for Moderna series) 2022     MEDICARE ANNUAL WELLNESS VISIT  10/24/2023     FALL RISK ASSESSMENT  10/24/2023     ADVANCE CARE PLANNING  10/24/2027     PHQ-2 (once per calendar year)  Completed     INFLUENZA VACCINE  Completed     Pneumococcal Vaccine: 65+ Years  Completed     ZOSTER IMMUNIZATION  Completed     IPV IMMUNIZATION   "Aged Out     MENINGITIS IMMUNIZATION  Aged Out     DEXA  Discontinued     COLORECTAL CANCER SCREENING  Discontinued     Lab work is in process    ROS:  Constitutional, HEENT, cardiovascular, pulmonary, GI, , musculoskeletal, neuro, skin, endocrine and psych systems are negative, except as otherwise noted.    OBJECTIVE:   /70   Pulse 72   Resp 16   Ht 1.676 m (5' 6\")   Wt 86.2 kg (190 lb 2 oz)   LMP  (LMP Unknown)   SpO2 96%   BMI 30.69 kg/m   Estimated body mass index is 30.69 kg/m  as calculated from the following:    Height as of this encounter: 1.676 m (5' 6\").    Weight as of this encounter: 86.2 kg (190 lb 2 oz).  EXAM:   GENERAL APPEARANCE: healthy, alert and no distress  EYES: Eyes grossly normal to inspection, PERRL and conjunctivae and sclerae normal  HENT: ear canals and TM's normal, nose and mouth without ulcers or lesions, oropharynx clear and oral mucous membranes moist  NECK: no adenopathy, no asymmetry, masses, or scars and thyroid normal to palpation  RESP: lungs clear to auscultation - no rales, rhonchi or wheezes  BREAST: declines exam  CV: regular rate and rhythm, normal S1 S2, no S3 or S4, no murmur, click or rub, no peripheral edema and peripheral pulses strong  ABDOMEN: soft, nontender, no hepatosplenomegaly, no masses and bowel sounds normal  MS: no musculoskeletal defects are noted and gait is age appropriate without ataxia  SKIN: no suspicious lesions or rashes  NEURO: Normal strength and tone, sensory exam grossly normal, mentation intact and speech normal  PSYCH: normal affect & mood    Diagnostic Test Results:  Labs reviewed in Epic  Results for orders placed or performed in visit on 10/24/22 (from the past 24 hour(s))   Lipid Profile ([facility])   Result Value Ref Range    CHOLESTEROL 125 100 - 199 mg/dl    HDL 43 40 mg/dl    TRIGLYCERIDES ([facility]) 108 0 - 149 mg/dl    LDL CALCULATED ([facility]) 60 0 - 130 mg/dl    CHOL/HDL RATIO ([facility]) 2.9 0.0 - 4.5 mg/dl   CBC with Diff/Plt ([facility]) "   Result Value Ref Range    WBC x10/cmm 5.3 3.8 - 11.0 x10/cmm    % Lymphocytes 23.8 20.5 - 51.1 %    % Monocytes 8.5 1.7 - 9.3 %    % Granulocytes 67.7 42.2 - 75.2 %    RBC x10/cmm 4.80 3.7 - 5.2 x10/cmm    Hemoglobin 14.7 11.8 - 15.5 g/dl    Hematocrit 43.6 35 - 46 %    MCV 90.8 80 - 100 fL    MCH 30.6 27.0 - 31.0 pg    MCHC 33.7 33.0 - 37.0 g/dL    Platelet Count 163 140 - 450 K/uL       ASSESSMENT / PLAN:   Zoar was seen today for physical.    Diagnoses and all orders for this visit:    Encounter for Medicare annual wellness exam  Age-appropriate preventative health maintenance along with diet, exercise and healthy weight discussed.    Chronic kidney disease, stage 3a (H)  Told the patient to avoid NSAIDs if at all possible. On statin but  NOT ACE/ARB - declines at this time  Will monitor closely and send to Nephrologist if renal function continues to decline.      Prediabetes  -     Comp. Metabolic Panel (14) ([facility])  -     Hemoglobin A1C ([facility])  -     VENOUS COLLECTION  Lab ordered to monitor. No medication indicated at this time    Hyperlipidemia, unspecified hyperlipidemia type  -     Lipid Profile ([facility])  -     VENOUS COLLECTION  Continue simvastatin without changes.     Osteopenia of both hips  -     CBC with Diff/Plt ([facility])  Vitamin D & calcium supplementation. Declines further DEXA scans    Obesity (BMI 30.0-34.9)  -     VENOUS COLLECTION  Recommend weight loss with diet & exercise.      Patient has been advised of split billing requirements and indicates understanding: Yes    COUNSELING:  Reviewed preventive health counseling, as reflected in patient instructions  Special attention given to:       Regular exercise       Healthy diet/nutrition       Vision screening       Hearing screening       Dental care       Bladder control       Fall risk prevention       Aspirin prophylaxis        Osteoporosis prevention/bone health       Advanced Planning     Estimated body mass index is 30.69 kg/m  as  "calculated from the following:    Height as of this encounter: 1.676 m (5' 6\").    Weight as of this encounter: 86.2 kg (190 lb 2 oz).    Weight management plan: Discussed healthy diet and exercise guidelines    She reports that she has never smoked. She has never used smokeless tobacco.    Appropriate preventive services were discussed with this patient, including applicable screening as appropriate for cardiovascular disease, diabetes, osteopenia/osteoporosis, and glaucoma.  As appropriate for age/gender, discussed screening for colorectal cancer, prostate cancer, breast cancer, and cervical cancer. Checklist reviewing preventive services available has been given to the patient.    Reviewed patients plan of care and provided an AVS. The Basic Care Plan (routine screening as documented in Health Maintenance) for Zora meets the Care Plan requirement. This Care Plan has been established and reviewed with the Patient.    DWAYNE Delaney [facility]  "

## 2022-10-25 LAB
ALBUMIN SERPL-MCNC: 4.6 G/DL (ref 3.6–4.6)
ALBUMIN/GLOB SERPL: 2.3 {RATIO} (ref 1.2–2.2)
ALP SERPL-CCNC: 56 IU/L (ref 44–121)
ALT SERPL-CCNC: 17 IU/L (ref 0–32)
AST SERPL-CCNC: 15 IU/L (ref 0–40)
BILIRUB SERPL-MCNC: 1 MG/DL (ref 0–1.2)
BUN SERPL-MCNC: 20 MG/DL (ref 8–27)
BUN/CREATININE RATIO: 22 (ref 12–28)
CALCIUM SERPL-MCNC: 9.4 MG/DL (ref 8.7–10.3)
CHLORIDE SERPLBLD-SCNC: 100 MMOL/L (ref 96–106)
CREAT SERPL-MCNC: 0.9 MG/DL (ref 0.57–1)
EGFR: 63 ML/MIN/1.73
GLOBULIN, TOTAL: 2 G/DL (ref 1.5–4.5)
GLUCOSE SERPL-MCNC: 104 MG/DL (ref 70–99)
HBA1C MFR BLD: 5.9 % (ref 4.8–5.6)
POTASSIUM SERPL-SCNC: 4.5 MMOL/L (ref 3.5–5.2)
PROT SERPL-MCNC: 6.6 G/DL (ref 6–8.5)
SODIUM SERPL-SCNC: 140 MMOL/L (ref 134–144)
TOTAL CO2: 24 MMOL/L (ref 20–29)

## 2022-11-28 ENCOUNTER — HOSPITAL ENCOUNTER (OUTPATIENT)
Dept: MAMMOGRAPHY | Facility: CLINIC | Age: 82
Discharge: HOME | End: 2022-11-28
Admitting: NURSE PRACTITIONER
Payer: MEDICARE

## 2022-11-28 DIAGNOSIS — Z12.31 VISIT FOR SCREENING MAMMOGRAM: ICD-10-CM

## 2022-11-28 PROCEDURE — 77067 SCR MAMMO BI INCL CAD: CPT

## 2023-02-16 ENCOUNTER — OFFICE VISIT (OUTPATIENT)
Dept: FAMILY MEDICINE | Facility: CLINIC | Age: 83
End: 2023-02-16

## 2023-02-16 VITALS
OXYGEN SATURATION: 97 % | DIASTOLIC BLOOD PRESSURE: 76 MMHG | RESPIRATION RATE: 16 BRPM | WEIGHT: 186 LBS | HEART RATE: 84 BPM | BODY MASS INDEX: 29.89 KG/M2 | HEIGHT: 66 IN | SYSTOLIC BLOOD PRESSURE: 136 MMHG | TEMPERATURE: 97.3 F

## 2023-02-16 DIAGNOSIS — N18.31 CHRONIC KIDNEY DISEASE, STAGE 3A (H): ICD-10-CM

## 2023-02-16 DIAGNOSIS — M79.672 LEFT FOOT PAIN: Primary | ICD-10-CM

## 2023-02-16 PROCEDURE — 99213 OFFICE O/P EST LOW 20 MIN: CPT | Performed by: NURSE PRACTITIONER

## 2023-02-16 NOTE — PROGRESS NOTES
"Problem(s) Oriented visit        SUBJECTIVE:                                                    Zora Holbrook is a 82 year old female who presents to clinic today for the following health issues :    Top of left foot intermittent sharp pain for past 2 weeks. Pain worse in the evening but sometimes pain wakes her up at night. Does not remember any trauma or injury but states it feels like inflammation. No treatments tried.     Problem list, Medication list, Allergies, and Medical/Social/Surgical histories reviewed in EPIC and updated as appropriate.   Additional history: as documented    ROS:  5 point ROS completed and negative except noted above, including Gen, CV, Resp, MS, Neuro    OBJECTIVE:                                                    /76   Pulse 84   Temp 97.3  F (36.3  C) (Temporal)   Resp 16   Ht 1.676 m (5' 6\")   Wt 84.4 kg (186 lb)   LMP  (LMP Unknown)   SpO2 97%   BMI 30.02 kg/m    Body mass index is 30.02 kg/m .   GENERAL: healthy, alert and no distress  MS: Normal left foot and ankle appearance and ROM. No pain with palpation of area  SKIN: no suspicious lesions or rashes  NEURO: Normal strength and tone, sensory exam grossly normal and mentation intact     ASSESSMENT/PLAN:                                                      Zora was seen today for foot problems.    Diagnoses and all orders for this visit:    Left foot pain  Normal exam. Recommend conservative cares including Tylenol, topical Voltaren, ice, elevation. Referral to Podiatry if fails to improve    Chronic kidney disease, stage 3a (H)  Avoiding oral NSAID medications. Stable.     See Patient Instructions  Patient Instructions   Over the counter Voltaren (anti-inflammatory) gel - can use up to 4 times daily. Safe for you since not an oral pill    Tylenol 1000 mg up to 3 times daily as needed    Elevated and apply ice for 10-15 min 2-3 times daily.     If pain not improving in next 2-4 weeks, then podiatry " referral      DWAYNE Delaney CNP  [facility]  [phone]    For any issues my office # is [phone]

## 2023-02-16 NOTE — PATIENT INSTRUCTIONS
Over the counter Voltaren (anti-inflammatory) gel - can use up to 4 times daily. Safe for you since not an oral pill    Tylenol 1000 mg up to 3 times daily as needed    Elevated and apply ice for 10-15 min 2-3 times daily.     If pain not improving in next 2-4 weeks, then podiatry referral

## 2023-05-01 ENCOUNTER — TRANSFERRED RECORDS (OUTPATIENT)
Dept: FAMILY MEDICINE | Facility: CLINIC | Age: 83
End: 2023-05-01

## 2023-05-12 ENCOUNTER — OFFICE VISIT (OUTPATIENT)
Dept: FAMILY MEDICINE | Facility: CLINIC | Age: 83
End: 2023-05-12

## 2023-05-12 ENCOUNTER — HOSPITAL ENCOUNTER (OUTPATIENT)
Dept: ULTRASOUND IMAGING | Facility: CLINIC | Age: 83
Discharge: HOME | End: 2023-05-12
Attending: FAMILY MEDICINE | Admitting: FAMILY MEDICINE
Payer: MEDICARE

## 2023-05-12 VITALS
WEIGHT: 186 LBS | HEIGHT: 66 IN | DIASTOLIC BLOOD PRESSURE: 86 MMHG | SYSTOLIC BLOOD PRESSURE: 140 MMHG | HEART RATE: 74 BPM | BODY MASS INDEX: 29.89 KG/M2 | OXYGEN SATURATION: 95 %

## 2023-05-12 DIAGNOSIS — M79.605 PAIN IN POSTERIOR LEFT LOWER EXTREMITY: Primary | ICD-10-CM

## 2023-05-12 DIAGNOSIS — M79.605 PAIN IN POSTERIOR LEFT LOWER EXTREMITY: ICD-10-CM

## 2023-05-12 PROCEDURE — 93971 EXTREMITY STUDY: CPT | Mod: LT

## 2023-05-12 PROCEDURE — 99215 OFFICE O/P EST HI 40 MIN: CPT | Performed by: FAMILY MEDICINE

## 2023-05-12 RX ORDER — HYDROCORTISONE 25 MG/G
OINTMENT TOPICAL
COMMUNITY
Start: 2023-05-01

## 2023-05-12 NOTE — PROGRESS NOTES
"  Fanny Blakely is a 82 year old patient who presents to clinic for evaluation.  Reports about 2 weeks of left posterior leg pain in popliteal area.  No redness.  No trauma.  No fever, SOB, chest pain.  Denies history of VTE.  No recent DVT risk factors.        Review of Systems   Constitutional, HEENT, cardiovascular, pulmonary, GI, , musculoskeletal, neuro, skin, endocrine and psych systems are negative, except as otherwise noted.      Objective    BP (!) 140/86   Pulse 74   Ht 1.676 m (5' 6\")   Wt 84.4 kg (186 lb)   LMP  (LMP Unknown)   SpO2 95%   BMI 30.02 kg/m      General: Well appearing, NAD  Ext: there is focal tenderness in left medial popliteal area with tender varicosities and mild swelling.  Normal pulses and sensation.  Psych: normal mood and affect        No results found for this or any previous visit (from the past 24 hour(s)).    Pain in posterior left lower extremity  Recommend stat ultrasound to rule out DVT.  SVT and baker's cyst also in DDx.  Follow up pending results.  - US Lower Extremity Venous Duplex Left; Future            "

## 2023-05-18 ENCOUNTER — TELEPHONE (OUTPATIENT)
Dept: OTHER | Facility: CLINIC | Age: 83
End: 2023-05-18
Payer: MEDICARE

## 2023-05-18 ENCOUNTER — TELEPHONE (OUTPATIENT)
Dept: FAMILY MEDICINE | Facility: CLINIC | Age: 83
End: 2023-05-18

## 2023-05-18 DIAGNOSIS — M79.605 PAIN IN POSTERIOR LEFT LOWER EXTREMITY: Primary | ICD-10-CM

## 2023-05-18 DIAGNOSIS — I83.90 VARICOSE VEIN OF LEG: ICD-10-CM

## 2023-05-18 NOTE — TELEPHONE ENCOUNTER
Pt referred to [facility] by Lauren Irwin MD for varicose veins and leg pain.    Pt needs to be scheduled for consult with any vascular medicine provider.  Will route to scheduling to coordinate an appointment at next available.    Appt note:  Pt referred to [facility] by Lauren Irwin MD for varicose veins and leg pain.    Christiana HARTMAN, ESTHER    [facility]  Office: [phone]  Fax: [phone]

## 2023-05-18 NOTE — TELEPHONE ENCOUNTER
Patient called clinic to report that she continues to have leg pain and she wishes to see a vascular doctor. Per Dr Costello referral entered. Katya Perez

## 2023-05-22 NOTE — TELEPHONE ENCOUNTER
Called to schedule in clinic consult, damaso stated she would give us a call back when her daughter is present so that she can properly schedule the appointment. Patient stated she is not having problems at this time, confirmed she had the correct number for our clinic and said she would call us back to schedule.

## 2023-05-30 ENCOUNTER — OFFICE VISIT (OUTPATIENT)
Dept: OTHER | Facility: CLINIC | Age: 83
End: 2023-05-30
Attending: PHYSICIAN ASSISTANT
Payer: MEDICARE

## 2023-05-30 VITALS
BODY MASS INDEX: 29.96 KG/M2 | WEIGHT: 186.4 LBS | OXYGEN SATURATION: 94 % | DIASTOLIC BLOOD PRESSURE: 84 MMHG | HEIGHT: 66 IN | SYSTOLIC BLOOD PRESSURE: 133 MMHG | HEART RATE: 75 BPM

## 2023-05-30 DIAGNOSIS — I87.303 CHRONIC VENOUS HYPERTENSION (IDIOPATHIC) WITHOUT COMPLICATIONS OF BILATERAL LOWER EXTREMITY: ICD-10-CM

## 2023-05-30 DIAGNOSIS — I87.2 VENOUS (PERIPHERAL) INSUFFICIENCY: Primary | ICD-10-CM

## 2023-05-30 PROCEDURE — 99204 OFFICE O/P NEW MOD 45 MIN: CPT | Performed by: PHYSICIAN ASSISTANT

## 2023-05-30 PROCEDURE — G0463 HOSPITAL OUTPT CLINIC VISIT: HCPCS

## 2023-05-30 NOTE — PROGRESS NOTES
[facility] INITIAL VASCULAR MEDICINE CONSULT      PRIMARY HEALTH CARE PROVIDER:  Lauren Irwin    REASON FOR CONSULT:  Symptomatic varicose veins      HPI: Zora Holbrook is a very pleasant 82 year old female with a history of breast cancer, hyperlipidemia, and DDD who has had varicose veins for a long time. However, a few weeks ago she developed pain in her left leg and palpated a lump in her left leg behind her knee and in her inner thigh which she now realizes is varicose veins. The pain then also traveled done her lower leg to her foot. She was seen at her clinic. A venous duplex was undertaken and negative for DVT but it did show some prominent varicose veins in the area of discomfort. She has tried wearing knee-high compression socks, but felt this made her pain worse. She now presents for further evaluation due to ongoing pain and discomfort. She knows of no family history of varicose veins.         PAST MEDICAL HISTORY  Past Medical History:   Diagnosis Date     ACP (advance care planning) 4-23-13    form given     Breast CA (H) 1995    lumpectomy and Radiation     Breast cancer (H)      Colon adenomas     Tubolar adenoma, colonoscopy, 2008     Degenerative arthritis of lumbar spine 2004    L5-S1     Hemorrhoids, external      Hypercholesterolemias      Osteopenia 2009     PONV (postoperative nausea and vomiting)      Renal disease     Stage 2       CURRENT MEDICATIONS  acetaminophen (TYLENOL) 325 MG tablet, Take 325-650 mg by mouth daily as needed.  Calcium Carbonate-Vitamin D (CALCIUM 600 + D OR), Take 600 mg by mouth 2 times daily.  Cholecalciferol (VITAMIN D) 1000 UNIT capsule, Take 1 capsule by mouth daily.  Multiple Vitamins-Minerals (CENTRUM SILVER) per tablet, Take 1 tablet by mouth daily.  simvastatin (ZOCOR) 20 MG tablet, Take 1 tablet (20 mg) by mouth At Bedtime  hydrocortisone 2.5 % ointment, APPLY OINTMENT TOPICALLY TO AFFECTED AREA TWICE DAILY  AROUND THE EYEBROW (Patient not taking: Reported on 5/30/2023)    No current facility-administered medications on file prior to visit.      PAST SURGICAL HISTORY:  Past Surgical History:   Procedure Laterality Date     DILATION AND CURETTAGE, HYSTEROSCOPY DIAGNOSTIC, COMBINED  3/14/2014    Procedure: COMBINED DILATION AND CURETTAGE, HYSTEROSCOPY DIAGNOSTIC;   DILATION AND CURETTAGE, HYSTEROSCOPY, POLYPECTOMY ;  Surgeon: Guillaume Quintanilla MD;  Location: [facility]     LUMPECTOMY BREAST  1995    Cancer Right side     LUMPECTOMY BREAST      [facility],Left side     OVARY SURGERY      resection     Uterine cyste resection      [facility]       ALLERGIES     Allergies   Allergen Reactions     Nsaids      Affects kidney tests     Blue Dyes (Parenteral) Rash       FAMILY HISTORY  Family History   Problem Relation Age of Onset     Heart Disease Mother      Diabetes Father         age 60's     Hyperlipidemia Sister      Hypertension Sister          SOCIAL HISTORY  Social History     Socioeconomic History     Marital status:      Spouse name: Not on file     Number of children: Not on file     Years of education: Not on file     Highest education level: Not on file   Occupational History     Not on file   Tobacco Use     Smoking status: Never     Smokeless tobacco: Never   Vaping Use     Vaping status: Not on file   Substance and Sexual Activity     Alcohol use: No     Alcohol/week: 0.0 standard drinks of alcohol     Drug use: No       ROS:   General: No change in weight, sleep or appetite.  Normal energy.  No fever or chills  Eyes: Negative for vision changes or eye problems  ENT: No problems with ears, nose or throat.  No difficulty swallowing.  Resp: No coughing, wheezing or shortness of breath  CV: No chest pains or palpitations  GI: No nausea, vomiting,  heartburn, abdominal pain, diarrhea, constipation or change in bowel habits  : No urinary frequency or dysuria, bladder or kidney problems  Musculoskeletal: No  "significant muscle or joint pains  Neurologic: No headaches, numbness, tingling, weakness, problems with balance or coordination  Psychiatric: No problems with anxiety, depression or mental health  Heme/immune/allergy: No history of bleeding or clotting problems or anemia.  No allergies or immune system problems  Endocrine: No history of thyroid disease, diabetes or other endocrine disorders  Skin: No rashes,worrisome lesions or skin problems  Vascular:  No claudication, lifestyle limiting or otherwise; no ischemic rest pain; no non-healing ulcers. No weakness, No loss of sensation      EXAM:  /84 (BP Location: Left arm, Patient Position: Chair, Cuff Size: Adult Regular)   Pulse 75   Ht 5' 6\" (1.676 m)   Wt 186 lb 6.4 oz (84.6 kg)   LMP  (LMP Unknown)   SpO2 94%   BMI 30.09 kg/m    In general, the patient is a pleasant female in no apparent distress.    HEENT: NC/AT.  PERRLA.  EOMI.  Sclerae white, not injected.  Dentition intact.    Heart: RRR. Normal S1, S2 splits physiologically. No murmur, rub, click, or gallop.   Lungs: CTA.  No ronchi, wheezes, rales.    Abdomen: Soft, nontender, nondistended.   Extremities: No clubbing, cyanosis, or edema.  No wounds.         Labs:  LIPID RESULTS:  Lab Results   Component Value Date    CHOL 125 10/24/2022    CHOL 152 10/18/2021    HDL 43 10/24/2022    HDL 54 10/18/2021    LDL 60 10/24/2022    LDL 76 10/18/2021    TRIG 108 10/24/2022    TRIG 125 10/18/2021       LIVER ENZYME RESULTS:  Lab Results   Component Value Date    AST 15 10/24/2022    ALT 17 10/24/2022       CBC RESULTS:  Lab Results   Component Value Date    WBC 5.3 10/24/2022    WBC 5.3 08/28/2008    RBC 4.80 10/24/2022    RBC 4.74 08/28/2008    HGB 14.7 10/24/2022    HCT 43.6 10/24/2022    MCV 90.8 10/24/2022    MCH 30.6 10/24/2022    MCHC 33.7 10/24/2022    RDW 13.7 08/28/2008     10/24/2022       BMP RESULTS:  Lab Results   Component Value Date     10/24/2022    POTASSIUM 4.5 10/24/2022    " CHLORIDE 100 10/24/2022     (H) 10/24/2022    BUN 20 10/24/2022    BUN 22 10/24/2022    CR 0.90 10/24/2022    GFRESTIMATED 71 08/28/2008    GFRESTBLACK 86 08/28/2008    IRVING 9.4 10/24/2022        A1C RESULTS:  Lab Results   Component Value Date    A1C 5.9 (H) 10/24/2022       Procedures:   EXAM: US LOWER EXTREMITY VENOUS DUPLEX LEFT  LOCATION: [facility]  DATE/TIME: 5/12/2023 7:04 PM CDT     INDICATION: Left popliteal pain.  COMPARISON: None.  TECHNIQUE: Venous Duplex ultrasound of the left lower extremity with and without compression, augmentation and duplex. Color flow and spectral Doppler with waveform analysis performed.     FINDINGS: Exam includes the common femoral, femoral, popliteal, and contralateral common femoral veins as well as segmentally visualized deep calf veins and greater saphenous vein.      LEFT: No deep vein thrombosis. No superficial thrombophlebitis. A few mildly prominent varicose veins are noted along the medial aspect of the left knee, but no thrombus is identified in this region. No popliteal cyst.                                                                      IMPRESSION:  No deep venous thrombosis in the left lower extremity.      Assessment and Plan:   Symptomatic varicose veins    -She has bulging symptomatic varicose veins (L>R) in her legs with pain and heaviness.   -Venous duplex was negative for DVT.   -Has only briefly tried knee-high compression socks.     Recommendations:   -Obtain venous competency study to further evaluate.   -Prescription given for compression socks (20-30 mmHg thigh high). She should wear these during the day and remove them at night.   -Elevate legs when able.   -Follow-up with us when imaging is completed.           Martine De Los Santos PA-C      45 minutes spent on the date of the encounter doing chart review, history and exam, documentation, and further activities as noted above.

## 2023-05-30 NOTE — PATIENT INSTRUCTIONS
Get venous competency studies of your legs. Follow-up with us after this is completed.     2. Get and start wearing thigh high compression socks (20-30 mmHg). Wear these during the day and remove them at night.     3. Call us with any questions or concerns ([phone]).

## 2023-05-30 NOTE — PROGRESS NOTES
"Patient is here to discuss consult    /64 (BP Location: Right arm, Patient Position: Chair, Cuff Size: Adult Regular)   Pulse 73   Ht 5' 6\" (1.676 m)   Wt 186 lb 6.4 oz (84.6 kg)   LMP  (LMP Unknown)   SpO2 94%   BMI 30.09 kg/m      Questions patient would like addressed today are: N/A.    Refills are needed: No    Has homecare services and agency name:  [facility] BRIDGES    "

## 2023-06-12 ENCOUNTER — ANCILLARY PROCEDURE (OUTPATIENT)
Dept: ULTRASOUND IMAGING | Facility: CLINIC | Age: 83
End: 2023-06-12
Attending: PHYSICIAN ASSISTANT
Payer: MEDICARE

## 2023-06-12 DIAGNOSIS — I87.303 CHRONIC VENOUS HYPERTENSION (IDIOPATHIC) WITHOUT COMPLICATIONS OF BILATERAL LOWER EXTREMITY: ICD-10-CM

## 2023-06-12 DIAGNOSIS — I87.2 VENOUS (PERIPHERAL) INSUFFICIENCY: ICD-10-CM

## 2023-06-12 PROCEDURE — 93970 EXTREMITY STUDY: CPT | Performed by: SURGERY

## 2023-06-15 ENCOUNTER — VIRTUAL VISIT (OUTPATIENT)
Dept: OTHER | Facility: CLINIC | Age: 83
End: 2023-06-15
Attending: PHYSICIAN ASSISTANT
Payer: MEDICARE

## 2023-06-15 DIAGNOSIS — I87.303 CHRONIC VENOUS HYPERTENSION (IDIOPATHIC) WITHOUT COMPLICATIONS OF BILATERAL LOWER EXTREMITY: Primary | ICD-10-CM

## 2023-06-15 DIAGNOSIS — I87.2 VENOUS (PERIPHERAL) INSUFFICIENCY: ICD-10-CM

## 2023-06-15 PROCEDURE — 99442 PR PHYSICIAN TELEPHONE EVALUATION 11-20 MIN: CPT | Performed by: PHYSICIAN ASSISTANT

## 2023-06-15 PROCEDURE — G0463 HOSPITAL OUTPT CLINIC VISIT: HCPCS | Mod: TEL

## 2023-06-15 NOTE — PROGRESS NOTES
[facility] VASCULAR [facility] CENTER  VASCULAR MEDICINE FOLLOW-UP VISIT      PRIMARY HEALTH CARE PROVIDER:  Lauren Irwin    REASON FOR VISIT:  Follow-up venous competency for symptomatic varicose veins      HPI: Zora Holbrook is a very pleasant 82 year old female with a history of breast cancer, hyperlipidemia, and DDD who has had varicose veins for a long time. However, a few weeks ago she developed pain in her left leg and palpated a lump in her left leg behind her knee and in her inner thigh which she now realizes is varicose veins. The pain then also traveled down her lower leg to her foot. She was seen at her clinic. A venous duplex was undertaken and negative for DVT but it did show some prominent varicose veins in the area of discomfort. She has tried wearing knee-high compression socks, but felt this made her pain worse. She presented to our vascular clinic on 5/30/23 for further evaluation due to ongoing pain and discomfort. She knows of no family history of varicose veins.     She was given a prescription for compression socks. Venous competency was also ordered. This showed bilateral common femoral vein incompetence as well as right SFJ and proximal thigh GSV incompetence and left SFJ through distal thigh, proximal calf and distal calf GSV incompetence with an incompetent  in left medial thigh.     She states that the compression socks help with the bulging varicose veins, but then her veins are bulging again by the morning. She denies any pain in her legs presently.         PAST MEDICAL HISTORY  Past Medical History:   Diagnosis Date     ACP (advance care planning) 4-23-13    form given     Breast CA (H) 1995    lumpectomy and Radiation     Breast cancer (H)      Colon adenomas     Tubolar adenoma, colonoscopy, 2008     Degenerative arthritis of lumbar spine 2004    L5-S1     Hemorrhoids, external      Hypercholesterolemias      Osteopenia 2009     PONV (postoperative nausea and  vomiting)      Renal disease     Stage 2       PAST SURGICAL HISTORY:  Past Surgical History:   Procedure Laterality Date     DILATION AND CURETTAGE, HYSTEROSCOPY DIAGNOSTIC, COMBINED  3/14/2014    Procedure: COMBINED DILATION AND CURETTAGE, HYSTEROSCOPY DIAGNOSTIC;   DILATION AND CURETTAGE, HYSTEROSCOPY, POLYPECTOMY ;  Surgeon: Guillaume Quintanilla MD;  Location: [facility]     LUMPECTOMY BREAST  1995    Cancer Right side     LUMPECTOMY BREAST      [facility],Left side     OVARY SURGERY      resection     [facility]         CURRENT MEDICATIONS  acetaminophen (TYLENOL) 325 MG tablet, Take 325-650 mg by mouth daily as needed.  Calcium Carbonate-Vitamin D (CALCIUM 600 + D OR), Take 600 mg by mouth 2 times daily.  Cholecalciferol (VITAMIN D) 1000 UNIT capsule, Take 1 capsule by mouth daily.  hydrocortisone 2.5 % ointment, APPLY OINTMENT TOPICALLY TO AFFECTED AREA TWICE DAILY AROUND THE EYEBROW (Patient not taking: Reported on 5/30/2023)  Multiple Vitamins-Minerals (CENTRUM SILVER) per tablet, Take 1 tablet by mouth daily.  simvastatin (ZOCOR) 20 MG tablet, Take 1 tablet (20 mg) by mouth At Bedtime    No current facility-administered medications on file prior to visit.      ALLERGIES     Allergies   Allergen Reactions     Nsaids      Affects kidney tests     Blue Dyes (Parenteral) Rash       FAMILY HISTORY  Family History   Problem Relation Age of Onset     Heart Disease Mother      Diabetes Father         age 60's     Hyperlipidemia Sister      Hypertension Sister        SOCIAL HISTORY  Social History     Socioeconomic History     Marital status:      Spouse name: Not on file     Number of children: Not on file     Years of education: Not on file     Highest education level: Not on file   Occupational History     Not on file   Tobacco Use     Smoking status: Never     Smokeless tobacco: Never   Vaping Use     Vaping status: Not on file   Substance and Sexual Activity     Alcohol use:  No     Alcohol/week: 0.0 standard drinks of alcohol     Drug use: No         ROS:   Complete ROS negative other than what is stated in HPI.     EXAM:  LMP  (LMP Unknown)   In general, the patient is a pleasant female in no apparent distress.    Unable to do an exam as this was a phone visit.     Labs:  LIPID RESULTS:  Lab Results   Component Value Date    CHOL 125 10/24/2022    CHOL 152 10/18/2021    HDL 43 10/24/2022    HDL 54 10/18/2021    LDL 60 10/24/2022    LDL 76 10/18/2021    TRIG 108 10/24/2022    TRIG 125 10/18/2021       LIVER ENZYME RESULTS:  Lab Results   Component Value Date    AST 15 10/24/2022    ALT 17 10/24/2022       CBC RESULTS:  Lab Results   Component Value Date    WBC 5.3 10/24/2022    WBC 5.3 08/28/2008    RBC 4.80 10/24/2022    RBC 4.74 08/28/2008    HGB 14.7 10/24/2022    HCT 43.6 10/24/2022    MCV 90.8 10/24/2022    MCH 30.6 10/24/2022    MCHC 33.7 10/24/2022    RDW 13.7 08/28/2008     10/24/2022       BMP RESULTS:  Lab Results   Component Value Date     10/24/2022    POTASSIUM 4.5 10/24/2022    CHLORIDE 100 10/24/2022     (H) 10/24/2022    BUN 20 10/24/2022    BUN 22 10/24/2022    CR 0.90 10/24/2022    GFRESTIMATED 71 08/28/2008    GFRESTBLACK 86 08/28/2008    IRVING 9.4 10/24/2022        A1C RESULTS:  Lab Results   Component Value Date    A1C 5.9 (H) 10/24/2022       THYROID RESULTS:  No results found for: TSH      Procedures:   BILATERAL LOWER EXTREMITY VENOUS DUPLEX FOR VENOUS INSUFFICIENCY  TECHNICAL SUMMARY 6/12/23     A duplex ultrasound study using color flow was performed to evaluate the bilateral lower extremity veins for valvular incompetence with the patient in a steep reversed trendelenberg.      RIGHT:     The deep veins demonstrate phasic flow, compress, and respond to augmentations.  There is no evidence of DVT.  The common femoral vein is incompetent and free of thrombus. The remaining deep veins are competent and free of thrombus.      The GSV demonstrates  phasic flow, compresses, and responds to augmentations from the saphenofemoral junction to the ankle with no evidence of thrombus. The greater saphenous vein measures 9.1 mm at the saphenofemoral junction, 5.4 mm in the proximal thigh, and 2.9 mm at the knee. The GSV is incompetent from the SFJ to the proximal thigh with the greatest reflux time of 3019 milliseconds.      The AASV is competent( 3.2 mm) draining into the saphenofemoral junction.      The Giacomini vein is competent ( 2.3 mm) communicating with the small saphenous vein at the knee level.     The SSV demonstrates phasic flow, compresses, and responds to augmentations from the popliteal space to the ankle.  No reflux or thrombus is seen. The saphenopopliteal junction is absent.      Perforators: There is no evidence of incompetent  veins at any level.      LEFT:     The deep veins demonstrate phasic flow, compress, and respond to augmentations.  There is no evidence of DVT.  The common femoral vein is incompetent and free of thrombus. The remaining deep veins are competent and free of thrombus.      The GSV demonstrates phasic flow, compresses, and responds to augmentations from the saphenofemoral junction to the ankle with no evidence of thrombus. The greater saphenous vein measures 12.9 mm at the saphenofemoral junction, 11.7 mm in the proximal thigh, and 4.0 mm at the knee. The GSV is incompetent from the SFJ to the distal thigh and the proximal and distal calf with the greatest reflux time of 6747 milliseconds.  The GSV gives rise to multiple incompetent varicose veins, the largest measures 8.0 mm off the distal thigh that courses posteriorly with a reflux time of 2178 milliseconds.      The AASV is competent/incompetent ( 2.8 mm) draining into the saphenofemoral junction.     The Giacomini vein is competent ( 1.8 mm) communicating with the small saphenous vein at the knee level.      The SSV demonstrates phasic flow, compresses, and  responds to augmentations from the popliteal space to the ankle.  No reflux or thrombus is seen. The saphenopopliteal junction is absent.      Perforators: There is an incompetent  vein ( 3.8 mm) approximately 10 cm above the medially knee crease in the distal that communicates with the GSV.     FINAL SUMMARY:  Right lower extremity:  1.  No deep vein thrombosis  2.  Right common femoral vein incompetence  3.  Right saphenofemoral junction and proximal thigh great saphenous vein incompetence.  The remaining great saphenous vein is competent  4.  No incompetent perforating veins     Left extremity:  1.  No deep vein thrombosis  2.  Left common femoral vein incompetence  3.  Left saphenofemoral junction through distal thigh, proximal calf and distal calf great saphenous vein incompetence, the source of incompetent varicosities in the thigh extending to the calf  4.  Incompetent  left medial thigh again with great saphenous vein       EXAM: US LOWER EXTREMITY VENOUS DUPLEX LEFT  LOCATION: [facility]  DATE/TIME: 5/12/2023 7:04 PM CDT     INDICATION: Left popliteal pain.  COMPARISON: None.  TECHNIQUE: Venous Duplex ultrasound of the left lower extremity with and without compression, augmentation and duplex. Color flow and spectral Doppler with waveform analysis performed.     FINDINGS: Exam includes the common femoral, femoral, popliteal, and contralateral common femoral veins as well as segmentally visualized deep calf veins and greater saphenous vein.      LEFT: No deep vein thrombosis. No superficial thrombophlebitis. A few mildly prominent varicose veins are noted along the medial aspect of the left knee, but no thrombus is identified in this region. No popliteal cyst.                                                                      IMPRESSION:  No deep venous thrombosis in the left lower extremity.      Assessment and Plan:   Symptomatic varicose veins     -She has  bulging symptomatic varicose veins (L>R) in her legs with pain and heaviness at times.   -Venous duplex was negative for DVT.   -Venous competency with bilateral common femoral vein incompetence as well as right SFJ and proximal thigh GSV incompetence and left SF through distal thigh, proximal calf and distal calf GSV incompetence with an incompetent  in left medial thigh.  -Feels thigh high compression socks have been helpful.      Recommendations:   -Continue to wear compression socks (20-30 mmHg thigh high). She should wear these during the day and remove them at night.   -Elevate legs when able.   -Follow-up with us in 3 months after continuous compression sock use. If still symptomatic, will refer to [facility].         Martine De Los Santos PA-C        Telephone Visit Details    Start Time: 12:43 pm   End Time: 12:55 pm    Originating Location (patient location): [place]  Distant Location (provider location): [facility]    This visit is being conducted as a virtual visit due to the emphasis on mitigation of the COVID-19 virus pandemic. The clinician has decided that the risk of an in-office visit outweighs the benefit for this patient.       20 minutes spent on the date of the encounter doing chart review, history and exam, documentation, and further activities as noted above.

## 2023-06-15 NOTE — PROGRESS NOTES
Zora is a 82 year old who is being evaluated via a billable telephone visit.      What phone number would you like to be contacted at? [phone]  How would you like to obtain your AVS? Sohailhart    Distant Location (provider location):  On-site        Objective         Vitals:  No vitals were obtained today due to virtual visit.      LENARD BRIDGES

## 2023-07-27 ENCOUNTER — TELEPHONE (OUTPATIENT)
Dept: OTHER | Facility: CLINIC | Age: 83
End: 2023-07-27
Payer: MEDICARE

## 2023-07-27 NOTE — TELEPHONE ENCOUNTER
Spoke with the patient about rescheduling her September appointment with Martine De Los Santos. Pt will be calling back as she needs to speak with her daughter first as her daughter is her .    Patient needs appointment rescheduled with different medicine provider.

## 2023-07-27 NOTE — TELEPHONE ENCOUNTER
Future Appointments   Date Time Provider Department Center   9/18/2023  8:40 AM Rojelio Barrett MD [facility]

## 2023-09-18 ENCOUNTER — OFFICE VISIT (OUTPATIENT)
Dept: OTHER | Facility: CLINIC | Age: 83
End: 2023-09-18
Attending: FAMILY MEDICINE
Payer: MEDICARE

## 2023-09-18 VITALS
BODY MASS INDEX: 29.1 KG/M2 | HEIGHT: 67 IN | HEART RATE: 98 BPM | OXYGEN SATURATION: 95 % | SYSTOLIC BLOOD PRESSURE: 133 MMHG | WEIGHT: 185.4 LBS | DIASTOLIC BLOOD PRESSURE: 82 MMHG

## 2023-09-18 DIAGNOSIS — I87.2 VENOUS (PERIPHERAL) INSUFFICIENCY: ICD-10-CM

## 2023-09-18 PROCEDURE — 99214 OFFICE O/P EST MOD 30 MIN: CPT | Performed by: INTERNAL MEDICINE

## 2023-09-18 PROCEDURE — G0463 HOSPITAL OUTPT CLINIC VISIT: HCPCS

## 2023-09-18 NOTE — PROGRESS NOTES
"Patient is here to discuss follow up    /82 (BP Location: Right arm, Patient Position: Chair, Cuff Size: Adult Regular)   Pulse 98   Ht 5' 6.5\" (1.689 m)   Wt 185 lb 6.4 oz (84.1 kg)   LMP  (LMP Unknown)   SpO2 95%   BMI 29.48 kg/m      Questions patient would like addressed today are: N/A.    Refills are needed: No    Has homecare services and agency name:  Jyoti BRIDGES"

## 2023-09-18 NOTE — PROGRESS NOTES
[facility] VASCULAR [facility] CENTER  VASCULAR MEDICINE FOLLOW-UP VISIT        PRIMARY HEALTH CARE PROVIDER:  Lauren Irwin     REASON FOR VISIT:  Follow-up venous competency for symptomatic varicose veins        HPI: Zora Holbrook is a very pleasant 82 year old female with a history of breast cancer, hyperlipidemia, and DDD who has had varicose veins for a long time. However, a few weeks ago she developed pain in her left leg and palpated a lump in her left leg behind her knee and in her inner thigh which she now realizes is varicose veins. The pain then also traveled down her lower leg to her foot. She was seen at her clinic. A venous duplex was undertaken and negative for DVT but it did show some prominent varicose veins in the area of discomfort. She has tried wearing knee-high compression socks, but felt this made her pain worse. She presented to our vascular clinic on 5/30/23 for further evaluation due to ongoing pain and discomfort. She knows of no family history of varicose veins.      She was given a prescription for compression socks. Venous competency was also ordered. This showed bilateral common femoral vein incompetence as well as right SFJ and proximal thigh GSV incompetence and left SFJ through distal thigh, proximal calf and distal calf GSV incompetence with an incompetent  in left medial thigh.      She states that the compression socks help with the bulging varicose veins, but then her veins are bulging again by the morning. She denies any pain in her legs presently.            PAST MEDICAL HISTORY  Past Medical History        Past Medical History:   Diagnosis Date    ACP (advance care planning) 4-23-13     form given    Breast CA (H) 1995     lumpectomy and Radiation    Breast cancer (H)      Colon adenomas      Tubolar adenoma, colonoscopy, 2008    Degenerative arthritis of lumbar spine 2004     L5-S1    Hemorrhoids, external      Hypercholesterolemias      Osteopenia 2009     PONV (postoperative nausea and vomiting)      Renal disease      Stage 2            PAST SURGICAL HISTORY:  Past Surgical History         Past Surgical History:   Procedure Laterality Date    DILATION AND CURETTAGE, HYSTEROSCOPY DIAGNOSTIC, COMBINED   3/14/2014     Procedure: COMBINED DILATION AND CURETTAGE, HYSTEROSCOPY DIAGNOSTIC;   DILATION AND CURETTAGE, HYSTEROSCOPY, POLYPECTOMY ;  Surgeon: Guillaume Quintanilla MD;  Location: [facility]    LUMPECTOMY BREAST   1995     Cancer Right side    LUMPECTOMY BREAST        [facility],Left side    OVARY SURGERY         resection    [facility]               CURRENT MEDICATIONS  acetaminophen (TYLENOL) 325 MG tablet, Take 325-650 mg by mouth daily as needed.  Calcium Carbonate-Vitamin D (CALCIUM 600 + D OR), Take 600 mg by mouth 2 times daily.  Cholecalciferol (VITAMIN D) 1000 UNIT capsule, Take 1 capsule by mouth daily.  hydrocortisone 2.5 % ointment, APPLY OINTMENT TOPICALLY TO AFFECTED AREA TWICE DAILY AROUND THE EYEBROW (Patient not taking: Reported on 5/30/2023)  Multiple Vitamins-Minerals (CENTRUM SILVER) per tablet, Take 1 tablet by mouth daily.  simvastatin (ZOCOR) 20 MG tablet, Take 1 tablet (20 mg) by mouth At Bedtime     No current facility-administered medications on file prior to visit.        ALLERGIES           Allergies   Allergen Reactions    Nsaids         Affects kidney tests    Blue Dyes (Parenteral) Rash         FAMILY HISTORY  Family History         Family History   Problem Relation Age of Onset    Heart Disease Mother      Diabetes Father           age 60's    Hyperlipidemia Sister      Hypertension Sister              SOCIAL HISTORY  Social History            Socioeconomic History    Marital status:        Spouse name: Not on file    Number of children: Not on file    Years of education: Not on file    Highest education level: Not on file   Occupational History    Not on file   Tobacco Use    Smoking status:  "Never    Smokeless tobacco: Never   Vaping Use    Vaping status: Not on file   Substance and Sexual Activity    Alcohol use: No       Alcohol/week: 0.0 standard drinks of alcohol    Drug use: No            ROS:   Complete ROS negative other than what is stated in HPI.      EXAM:  /82 (BP Location: Right arm, Patient Position: Chair, Cuff Size: Adult Regular)   Pulse 98   Ht 5' 6.5\" (1.689 m)   Wt 185 lb 6.4 oz (84.1 kg)   LMP  (LMP Unknown)   SpO2 95%   BMI 29.48 kg/m          In general, the patient is a pleasant female in no apparent distress.    HEENT: NC/AT.  PERRLA.  EOMI.  Sclerae white, not injected.  Dentition intact.    Heart: RRR. Normal S1, S2 splits physiologically. No murmur, rub, click, or gallop.   Lungs: CTA.  No ronchi, wheezes, rales.    Abdomen: Soft, nontender, nondistended.   Extremities: No clubbing, cyanosis, or edema.  No wounds. CEAP C4 venous insufficiency        Labs:  LIPID RESULTS:        Lab Results   Component Value Date     CHOL 125 10/24/2022     CHOL 152 10/18/2021     HDL 43 10/24/2022     HDL 54 10/18/2021     LDL 60 10/24/2022     LDL 76 10/18/2021     TRIG 108 10/24/2022     TRIG 125 10/18/2021         LIVER ENZYME RESULTS:        Lab Results   Component Value Date     AST 15 10/24/2022     ALT 17 10/24/2022         CBC RESULTS:        Lab Results   Component Value Date     WBC 5.3 10/24/2022     WBC 5.3 08/28/2008     RBC 4.80 10/24/2022     RBC 4.74 08/28/2008     HGB 14.7 10/24/2022     HCT 43.6 10/24/2022     MCV 90.8 10/24/2022     MCH 30.6 10/24/2022     MCHC 33.7 10/24/2022     RDW 13.7 08/28/2008      10/24/2022         BMP RESULTS:        Lab Results   Component Value Date      10/24/2022     POTASSIUM 4.5 10/24/2022     CHLORIDE 100 10/24/2022      (H) 10/24/2022     BUN 20 10/24/2022     BUN 22 10/24/2022     CR 0.90 10/24/2022     GFRESTIMATED 71 08/28/2008     GFRESTBLACK 86 08/28/2008     IRVING 9.4 10/24/2022         A1C RESULTS:      "   Lab Results   Component Value Date     A1C 5.9 (H) 10/24/2022         THYROID RESULTS:  No results found for: TSH        Procedures:   BILATERAL LOWER EXTREMITY VENOUS DUPLEX FOR VENOUS INSUFFICIENCY  TECHNICAL SUMMARY 6/12/23     A duplex ultrasound study using color flow was performed to evaluate the bilateral lower extremity veins for valvular incompetence with the patient in a steep reversed trendelenberg.      RIGHT:     The deep veins demonstrate phasic flow, compress, and respond to augmentations.  There is no evidence of DVT.  The common femoral vein is incompetent and free of thrombus. The remaining deep veins are competent and free of thrombus.      The GSV demonstrates phasic flow, compresses, and responds to augmentations from the saphenofemoral junction to the ankle with no evidence of thrombus. The greater saphenous vein measures 9.1 mm at the saphenofemoral junction, 5.4 mm in the proximal thigh, and 2.9 mm at the knee. The GSV is incompetent from the SFJ to the proximal thigh with the greatest reflux time of 3019 milliseconds.      The AASV is competent( 3.2 mm) draining into the saphenofemoral junction.      The Giacomini vein is competent ( 2.3 mm) communicating with the small saphenous vein at the knee level.     The SSV demonstrates phasic flow, compresses, and responds to augmentations from the popliteal space to the ankle.  No reflux or thrombus is seen. The saphenopopliteal junction is absent.      Perforators: There is no evidence of incompetent  veins at any level.      LEFT:     The deep veins demonstrate phasic flow, compress, and respond to augmentations.  There is no evidence of DVT.  The common femoral vein is incompetent and free of thrombus. The remaining deep veins are competent and free of thrombus.      The GSV demonstrates phasic flow, compresses, and responds to augmentations from the saphenofemoral junction to the ankle with no evidence of thrombus. The greater  saphenous vein measures 12.9 mm at the saphenofemoral junction, 11.7 mm in the proximal thigh, and 4.0 mm at the knee. The GSV is incompetent from the SFJ to the distal thigh and the proximal and distal calf with the greatest reflux time of 6747 milliseconds.  The GSV gives rise to multiple incompetent varicose veins, the largest measures 8.0 mm off the distal thigh that courses posteriorly with a reflux time of 2178 milliseconds.      The AASV is competent/incompetent ( 2.8 mm) draining into the saphenofemoral junction.     The Giacomini vein is competent ( 1.8 mm) communicating with the small saphenous vein at the knee level.      The SSV demonstrates phasic flow, compresses, and responds to augmentations from the popliteal space to the ankle.  No reflux or thrombus is seen. The saphenopopliteal junction is absent.      Perforators: There is an incompetent  vein ( 3.8 mm) approximately 10 cm above the medially knee crease in the distal that communicates with the GSV.     FINAL SUMMARY:  Right lower extremity:  1.  No deep vein thrombosis  2.  Right common femoral vein incompetence  3.  Right saphenofemoral junction and proximal thigh great saphenous vein incompetence.  The remaining great saphenous vein is competent  4.  No incompetent perforating veins     Left extremity:  1.  No deep vein thrombosis  2.  Left common femoral vein incompetence  3.  Left saphenofemoral junction through distal thigh, proximal calf and distal calf great saphenous vein incompetence, the source of incompetent varicosities in the thigh extending to the calf  4.  Incompetent  left medial thigh again with great saphenous vein        EXAM: US LOWER EXTREMITY VENOUS DUPLEX LEFT  LOCATION: [facility]  DATE/TIME: 5/12/2023 7:04 PM CDT     INDICATION: Left popliteal pain.  COMPARISON: None.  TECHNIQUE: Venous Duplex ultrasound of the left lower extremity with and without compression, augmentation  and duplex. Color flow and spectral Doppler with waveform analysis performed.     FINDINGS: Exam includes the common femoral, femoral, popliteal, and contralateral common femoral veins as well as segmentally visualized deep calf veins and greater saphenous vein.      LEFT: No deep vein thrombosis. No superficial thrombophlebitis. A few mildly prominent varicose veins are noted along the medial aspect of the left knee, but no thrombus is identified in this region. No popliteal cyst.                                                                      IMPRESSION:  No deep venous thrombosis in the left lower extremity.        Assessment and Plan:   Symptomatic varicose veins     -She has bulging symptomatic varicose veins (L>R) in her legs with pain and heaviness at times.   -Venous duplex was negative for DVT.   -Venous competency with bilateral common femoral vein incompetence as well as right SFJ and proximal thigh GSV incompetence and left SF through distal thigh, proximal calf and distal calf GSV incompetence with an incompetent  in left medial thigh.  -Feels thigh high compression socks have been helpful.   -She expressed concern on multiple occasions during this visit that she might have a LLE DVT. I advised her that her exam was not consistent with this but that physical exam alone in insensitive to juve this dx in or out. I offered repeat venous duplex on multiple occasions, but she declined to proceed.     Recommendations:   -Continue to wear compression socks (20-30 mmHg thigh high).   -Elevate legs when able.   -Follow-up with us prn.  If still symptomatic, will refer to [facility].          Rojelio Barrett MD        32 minutes total medical care on today's date.

## 2023-10-02 DIAGNOSIS — E78.5 HYPERLIPIDEMIA, UNSPECIFIED HYPERLIPIDEMIA TYPE: ICD-10-CM

## 2023-10-02 RX ORDER — SIMVASTATIN 20 MG
20 TABLET ORAL AT BEDTIME
Qty: 90 TABLET | Refills: 3 | Status: SHIPPED | OUTPATIENT
Start: 2023-10-02 | End: 2024-09-04

## 2023-10-02 NOTE — TELEPHONE ENCOUNTER
Med: Simvastatin      LOV (related): 10/24/22    Last Lab: 10/24/22      Due for F/U around:   Return in about 53 weeks (around 10/30/2023) for Annual Wellness Visit.    Next Appt: None           Cholesterol   Date Value Ref Range Status   10/24/2022 125 100 - 199 mg/dl Final   10/18/2021 152 100 - 199 mg/dL Final   10/05/2020 154 100 - 199 mg/dL Final     HDL Cholesterol   Date Value Ref Range Status   10/18/2021 54 >39 mg/dL Final   10/05/2020 49 >39 mg/dL Final     HDL   Date Value Ref Range Status   10/24/2022 43 40 mg/dl Final     LDL Cholesterol Calculated   Date Value Ref Range Status   10/18/2021 76 0 - 99 mg/dL Final   10/05/2020 77 0 - 99 mg/dL Final     LDL CALCULATED ([facility])   Date Value Ref Range Status   10/24/2022 60 0 - 130 mg/dl Final     Triglycerides   Date Value Ref Range Status   10/24/2022 108 0 - 149 mg/dl Final   10/18/2021 125 0 - 149 mg/dL Final   10/05/2020 164 (H) 0 - 149 mg/dL Final     No results found for: CHOLHDLRATIO

## 2023-12-08 NOTE — PATIENT INSTRUCTIONS
Melatonin 3mg - Take 30 minutes before bed   Keep a bedtime routine - winding down 30 minutes before bed, do not watch TV 30 minutes prior to bed   Trazodone - sleep option       Patient Education   Personalized Prevention Plan  You are due for the preventive services outlined below.  Your care team is available to assist you in scheduling these services.  If you have already completed any of these items, please share that information with your care team to update in your medical record.  Health Maintenance Due   Topic Date Due    RSV VACCINE (Pregnancy & 60+) (1 - 1-dose 60+ series) Never done    Diptheria Tetanus Pertussis (DTAP/TDAP/TD) Vaccine (1 - Tdap) 03/18/2009    PHQ-2 (once per calendar year)  01/01/2023    Basic Metabolic Panel  10/24/2023    Cholesterol Lab  10/24/2023    FALL RISK ASSESSMENT  10/24/2023    Hemoglobin  10/24/2023

## 2023-12-08 NOTE — PROGRESS NOTES
"SUBJECTIVE:   Zora Holbrook is a 83 year old female who presents for Preventive Visit.    Hyperlipidemia - Taking Simvastatin 20 mg daily with occasional lower leg cramps primarily at night.      Prediabetes - not on medication     CKD stage 3a - avoiding NSAIDs, not on ACE/ARB     Osteopenia bilateral hips - refuses further dexa scans - states her daughter takes her to appointments and it is difficult to get daughter to take her most of the time   Walking is top activity     Difficulty staying asleep    Interested in handicap parking forms to be completed - no physical anomaly just does not want to walk very far     Send results in the mail     Are you in the first 12 months of your Medicare Part B coverage?  No    Physical Health:  In general, how would you rate your overall physical health? good  Outside of work, how many days during the week do you exercise? 2-3 days/week  Outside of work, approximately how many minutes a day do you exercise?15-30 minutes  If you drink alcohol do you typically have >3 drinks per day or >7 drinks per week? No  Do you usually eat at least 4 servings of fruit and vegetables a day, include whole grains & fiber and avoid regularly eating high fat or \"junk\" foods? Yes  Do you have any problems taking medications regularly?  No  Do you have any side effects from medications? none  Needs assistance for the following daily activities: no assistance needed  Which of the following safety concerns are present in your home?  none identified   Hearing impairment: See Below  In the past 6 months, have you been bothered by leaking of urine? yes    Mental Health:  In general, how would you rate your overall mental or emotional health? good  PHQ-2 Score:      Do you feel safe in your environment? Yes    Have you ever done Advance Care Planning? (For example, a Health Directive, POLST, or a discussion with a medical provider or your loved ones about your wishes): Yes, advance care planning is " on file.    Hearing Screen  Left ear:  500Hz  Pass  1000Hz  Pass  2000Hz  Pass  4000Hz  Pass    Right ear:  500Hz  Fail  1000Hz  Pass  2000Hz  Fail  4000Hz  Fail    Fall risk:  Fallen 2 or more times in the past year?: No  Any fall with injury in the past year?: No    Cognitive Screenin) Repeat 3 items (Leader, Season, Table)    2) Clock draw: NORMAL  3) 3 item recall: Recalls 2 objects   Results: NORMAL clock, 1-2 items recalled: COGNITIVE IMPAIRMENT LESS LIKELY    Mini-CogTM Copyright JONATHON Rainey. Licensed by the author for use in [facility]; reprinted with permission (jodie@[email or web address]). All rights reserved.      Do you have sleep apnea, excessive snoring or daytime drowsiness? : no    Reviewed and updated as needed this visit by clinical staff                  Reviewed and updated as needed this visit by Provider                 Social History     Tobacco Use    Smoking status: Never    Smokeless tobacco: Never   Substance Use Topics    Alcohol use: No     Alcohol/week: 0.0 standard drinks of alcohol              No data to display                   No data to display              Do you have a current opioid prescription? No  Do you use any other controlled substances or medications that are not prescribed by a provider? None                     Current providers sharing in care for this patient include:   Patient Care Team:  Lauren Irwin MD as PCP - General (Family Medicine)  Leonides Costello MD as Assigned PCP  Rojelio Barrett MD as Assigned Heart and Vascular Provider    The following health maintenance items are reviewed in Epic and correct as of today:  Health Maintenance   Topic Date Due    RSV VACCINE (Pregnancy & 60+) (1 - 1-dose 60+ series) Never done    DTAP/TDAP/TD IMMUNIZATION (1 - Tdap) 2009    PHQ-2 (once per calendar year)  2023    BMP  10/24/2023    LIPID  10/24/2023    FALL RISK ASSESSMENT  10/24/2023    HEMOGLOBIN  10/24/2023    MEDICARE ANNUAL  "WELLNESS VISIT  12/11/2024    ADVANCE CARE PLANNING  10/24/2027    INFLUENZA VACCINE  Completed    Pneumococcal Vaccine: 65+ Years  Completed    URINALYSIS  Completed    ZOSTER IMMUNIZATION  Completed    COVID-19 Vaccine  Completed    IPV IMMUNIZATION  Aged Out    HPV IMMUNIZATION  Aged Out    MENINGITIS IMMUNIZATION  Aged Out    RSV MONOCLONAL ANTIBODY  Aged Out    MICROALBUMIN  Discontinued    COLORECTAL CANCER SCREENING  Discontinued     Lab work is in process      ROS:  Constitutional, HEENT, cardiovascular, pulmonary, gi and gu systems are negative, except as otherwise noted.    OBJECTIVE:   LMP  (LMP Unknown)  Estimated body mass index is 29.48 kg/m  as calculated from the following:    Height as of 9/18/23: 1.689 m (5' 6.5\").    Weight as of 9/18/23: 84.1 kg (185 lb 6.4 oz).  EXAM:   GENERAL: healthy, alert and no distress  NECK: no adenopathy, no asymmetry, masses, or scars and thyroid normal to palpation  RESP: lungs clear to auscultation - no rales, rhonchi or wheezes  CV: regular rate and rhythm, normal S1 S2, no S3 or S4, no murmur, click or rub, no peripheral edema and peripheral pulses strong  ABDOMEN: soft, nontender, no hepatosplenomegaly, no masses and bowel sounds normal  MS: no gross musculoskeletal defects noted, no edema    Diagnostic Test Results:  Labs reviewed in Epic    ASSESSMENT / PLAN:   Encounter for Medicare annual wellness exam  Age-appropriate preventative health maintenance along with diet, exercise and healthy weight discussed. Extensive discussion on osteoporosis and importance of doing one more dexa scan to check for status - if osteopenic should consider treatment      Chronic kidney disease, stage 3a (H)  Told the patient to avoid NSAIDs if at all possible. On statin but  NOT ACE/ARB - declines at this time  Will monitor closely and send to Nephrologist if renal function continues to decline.       Prediabetes  -     Comp. Metabolic Panel (14) ([facility])  -     Hemoglobin A1C " "([facility])  -     VENOUS COLLECTION  Lab ordered to monitor. No medication indicated at this time     Hyperlipidemia, unspecified hyperlipidemia type  -     Lipid Profile ([facility])  -     VENOUS COLLECTION  Continue simvastatin without changes.      Osteopenia of both hips  -     CBC with Diff/Plt ([facility])  Vitamin D & calcium supplementation. After thorough discussion she will try to do this during her mammogram      Obesity (BMI 30.0-34.9)  -     VENOUS COLLECTION  Recommend weight loss with diet & exercise. Weight bearing exercise for osteoporosis prevention     Patient has been advised of split billing requirements and indicates understanding: Yes    COUNSELING:  Reviewed preventive health counseling, as reflected in patient instructions       Regular exercise       Healthy diet/nutrition       Vision screening       Hearing screening       Bladder control       Fall risk prevention       Osteoporosis prevention/bone health    Estimated body mass index is 29.48 kg/m  as calculated from the following:    Height as of 9/18/23: 1.689 m (5' 6.5\").    Weight as of 9/18/23: 84.1 kg (185 lb 6.4 oz).    Weight management plan: Discussed healthy diet and exercise guidelines    She reports that she has never smoked. She has never used smokeless tobacco.    I have reviewed Opioid Use Disorder and Substance Use Disorder risk factors and made any needed referrals.   Appropriate preventive services were discussed with this patient, including applicable screening as appropriate for cardiovascular disease, diabetes, osteopenia/osteoporosis, and glaucoma.  As appropriate for age/gender, discussed screening for colorectal cancer, prostate cancer, breast cancer, and cervical cancer. Checklist reviewing preventive services available has been given to the patient.    Reviewed patients plan of care and provided an AVS. The Basic Care Plan (routine screening as documented in Health Maintenance) for Zora meets the Care Plan requirement. This " Care Plan has been established and reviewed with the Patient.    Counseling Resources:  ATP IV Guidelines  Pooled Cohorts Equation Calculator  Breast Cancer Risk Calculator  BRCA-Related Cancer Risk Assessment: FHS-7 Tool  FRAX Risk Assessment  ICSI Preventive Guidelines  Dietary Guidelines for Americans, 2010  USDA's MyPlate  ASA Prophylaxis  Lung CA Screening    DWAYNE Oconnor CNP  [facility]

## 2023-12-11 ENCOUNTER — OFFICE VISIT (OUTPATIENT)
Dept: FAMILY MEDICINE | Facility: CLINIC | Age: 83
End: 2023-12-11

## 2023-12-11 VITALS
HEART RATE: 83 BPM | OXYGEN SATURATION: 96 % | DIASTOLIC BLOOD PRESSURE: 80 MMHG | HEIGHT: 66 IN | BODY MASS INDEX: 29.73 KG/M2 | SYSTOLIC BLOOD PRESSURE: 139 MMHG | WEIGHT: 185 LBS

## 2023-12-11 DIAGNOSIS — E66.811 OBESITY (BMI 30.0-34.9): ICD-10-CM

## 2023-12-11 DIAGNOSIS — N18.31 CHRONIC KIDNEY DISEASE, STAGE 3A (H): ICD-10-CM

## 2023-12-11 DIAGNOSIS — M85.852 OSTEOPENIA OF BOTH HIPS: ICD-10-CM

## 2023-12-11 DIAGNOSIS — E78.5 HYPERLIPIDEMIA, UNSPECIFIED HYPERLIPIDEMIA TYPE: ICD-10-CM

## 2023-12-11 DIAGNOSIS — M85.851 OSTEOPENIA OF BOTH HIPS: ICD-10-CM

## 2023-12-11 DIAGNOSIS — R73.03 PREDIABETES: ICD-10-CM

## 2023-12-11 DIAGNOSIS — Z00.00 ENCOUNTER FOR MEDICARE ANNUAL WELLNESS EXAM: Primary | ICD-10-CM

## 2023-12-11 DIAGNOSIS — Z78.0 POST-MENOPAUSAL: ICD-10-CM

## 2023-12-11 LAB
% GRANULOCYTES: 72.2 % (ref 42.2–75.2)
ALBUMIN SERPL BCG-MCNC: 4.5 G/DL (ref 3.5–5.2)
ALP SERPL-CCNC: 63 U/L (ref 40–150)
ALT SERPL W P-5'-P-CCNC: 20 U/L (ref 0–50)
ANION GAP SERPL CALCULATED.3IONS-SCNC: 10 MMOL/L (ref 7–15)
AST SERPL W P-5'-P-CCNC: 24 U/L (ref 0–45)
BILIRUB SERPL-MCNC: 1 MG/DL
BUN SERPL-MCNC: 22 MG/DL (ref 8–23)
CALCIUM SERPL-MCNC: 9.7 MG/DL (ref 8.8–10.2)
CHLORIDE SERPL-SCNC: 100 MMOL/L (ref 98–107)
CHOLESTEROL: 155 MG/DL (ref 100–199)
CREAT SERPL-MCNC: 0.95 MG/DL (ref 0.51–0.95)
DEPRECATED HCO3 PLAS-SCNC: 29 MMOL/L (ref 22–29)
EGFRCR SERPLBLD CKD-EPI 2021: 58 ML/MIN/1.73M2
FASTING?: YES
GLUCOSE SERPL-MCNC: 109 MG/DL (ref 70–99)
HBA1C MFR BLD: 6.2 %
HCT VFR BLD AUTO: 46.3 % (ref 35–46)
HDL (RMG): 39 MG/DL (ref 40–?)
HEMOGLOBIN: 15.7 G/DL (ref 11.8–15.5)
LDL CALCULATED (RMG): 89 MG/DL (ref 0–130)
LYMPHOCYTES NFR BLD AUTO: 21.5 % (ref 20.5–51.1)
MCH RBC QN AUTO: 30.5 PG (ref 27–31)
MCHC RBC AUTO-ENTMCNC: 34 G/DL (ref 33–37)
MCV RBC AUTO: 89.7 FL (ref 80–100)
MONOCYTES NFR BLD AUTO: 6.3 % (ref 1.7–9.3)
PLATELET # BLD AUTO: 173 K/UL (ref 140–450)
POTASSIUM SERPL-SCNC: 4.3 MMOL/L (ref 3.4–5.3)
PROT SERPL-MCNC: 7.4 G/DL (ref 6.4–8.3)
RBC # BLD AUTO: 5.16 X10/CMM (ref 3.7–5.2)
SODIUM SERPL-SCNC: 139 MMOL/L (ref 135–145)
TRIGLYCERIDES (RMG): 135 MG/DL (ref 0–149)
WBC # BLD AUTO: 5.1 X10/CMM (ref 3.8–11)

## 2023-12-11 PROCEDURE — G0439 PPPS, SUBSEQ VISIT: HCPCS

## 2023-12-11 PROCEDURE — 80053 COMPREHEN METABOLIC PANEL: CPT | Mod: ORL

## 2023-12-11 PROCEDURE — 85025 COMPLETE CBC W/AUTO DIFF WBC: CPT

## 2023-12-11 PROCEDURE — 36415 COLL VENOUS BLD VENIPUNCTURE: CPT

## 2023-12-11 PROCEDURE — 83036 HEMOGLOBIN GLYCOSYLATED A1C: CPT | Mod: ORL

## 2023-12-11 PROCEDURE — 80061 LIPID PANEL: CPT | Mod: QW

## 2023-12-11 NOTE — LETTER
December 15, 2023      Zora Holbrook  [address] [id number]        Dear ,        HgbA1c elevated. Please watch sugar intake and try to eat smaller portion sizes.       Thank you for trusting me with your care!  Theresa Pineda, APRN CNP    Resulted Orders   Lipid Profile ([facility])   Result Value Ref Range    Cholesterol 155 100 - 199 mg/dL    HDL 39 (A) 40 mg/dL    Triglycerides 135 0 - 149 mg/dL    LDL CALCULATED ([facility]) 89 0 - 130 mg/dL    Patient Fasting? Yes    CBC with Diff/Plt ([facility])   Result Value Ref Range    WBC x10/cmm 5.1 3.8 - 11.0 x10/cmm    % Lymphocytes 21.5 20.5 - 51.1 %    % Monocytes 6.3 1.7 - 9.3 %    % Granulocytes 72.2 42.2 - 75.2 %    RBC x10/cmm 5.16 3.7 - 5.2 x10/cmm    Hemoglobin 15.7 (A) 11.8 - 15.5 g/dl    Hematocrit 46.3 (A) 35 - 46 %    MCV 89.7 80 - 100 fL    MCH 30.5 27.0 - 31.0 pg    MCHC 34.0 33.0 - 37.0 g/dL    Platelet Count 173 140 - 450 K/uL   Comprehensive metabolic panel   Result Value Ref Range    Sodium 139 135 - 145 mmol/L      Comment:      Reference intervals for this test were updated on 09/26/2023 to more accurately reflect our healthy population. There may be differences in the flagging of prior results with similar values performed with this method. Interpretation of those prior results can be made in the context of the updated reference intervals.     Potassium 4.3 3.4 - 5.3 mmol/L    Carbon Dioxide (CO2) 29 22 - 29 mmol/L    Anion Gap 10 7 - 15 mmol/L    Urea Nitrogen 22.0 8.0 - 23.0 mg/dL    Creatinine 0.95 0.51 - 0.95 mg/dL    GFR Estimate 58 (L) >60 mL/min/1.73m2    Calcium 9.7 8.8 - 10.2 mg/dL    Chloride 100 98 - 107 mmol/L    Glucose 109 (H) 70 - 99 mg/dL    Alkaline Phosphatase 63 40 - 150 U/L      Comment:      Reference intervals for this test were updated on 11/14/2023 to more accurately reflect our healthy population. There may be differences in the flagging of prior results with similar values performed with this method.  Interpretation of those prior results can be made in the context of the updated reference intervals.    AST 24 0 - 45 U/L      Comment:      Reference intervals for this test were updated on 6/12/2023 to more accurately reflect our healthy population. There may be differences in the flagging of prior results with similar values performed with this method. Interpretation of those prior results can be made in the context of the updated reference intervals.    ALT 20 0 - 50 U/L      Comment:      Reference intervals for this test were updated on 6/12/2023 to more accurately reflect our healthy population. There may be differences in the flagging of prior results with similar values performed with this method. Interpretation of those prior results can be made in the context of the updated reference intervals.      Protein Total 7.4 6.4 - 8.3 g/dL    Albumin 4.5 3.5 - 5.2 g/dL    Bilirubin Total 1.0 <=1.2 mg/dL   Hemoglobin A1c   Result Value Ref Range    Hemoglobin A1C 6.2 (H) <5.7 %      Comment:      Normal <5.7%   Prediabetes 5.7-6.4%    Diabetes 6.5% or higher     Note: Adopted from ADA consensus guidelines.

## 2024-03-07 ENCOUNTER — HOSPITAL ENCOUNTER (OUTPATIENT)
Dept: MAMMOGRAPHY | Facility: CLINIC | Age: 84
Discharge: HOME | End: 2024-03-07
Attending: NURSE PRACTITIONER
Payer: MEDICARE

## 2024-03-07 ENCOUNTER — HOSPITAL ENCOUNTER (OUTPATIENT)
Dept: BONE DENSITY | Facility: CLINIC | Age: 84
Discharge: HOME | End: 2024-03-07
Payer: MEDICARE

## 2024-03-07 DIAGNOSIS — Z78.0 POST-MENOPAUSAL: ICD-10-CM

## 2024-03-07 DIAGNOSIS — Z12.31 VISIT FOR SCREENING MAMMOGRAM: ICD-10-CM

## 2024-03-07 DIAGNOSIS — M85.852 OSTEOPENIA OF BOTH HIPS: ICD-10-CM

## 2024-03-07 DIAGNOSIS — M85.851 OSTEOPENIA OF BOTH HIPS: ICD-10-CM

## 2024-03-07 PROCEDURE — 77080 DXA BONE DENSITY AXIAL: CPT

## 2024-03-07 PROCEDURE — 77063 BREAST TOMOSYNTHESIS BI: CPT

## 2024-06-17 ENCOUNTER — TRANSFERRED RECORDS (OUTPATIENT)
Dept: FAMILY MEDICINE | Facility: CLINIC | Age: 84
End: 2024-06-17

## 2024-07-30 ENCOUNTER — HOSPITAL ENCOUNTER (EMERGENCY)
Facility: CLINIC | Age: 84
Discharge: HOME | End: 2024-07-30
Attending: PHYSICIAN ASSISTANT | Admitting: PHYSICIAN ASSISTANT
Payer: MEDICARE

## 2024-07-30 ENCOUNTER — APPOINTMENT (OUTPATIENT)
Dept: GENERAL RADIOLOGY | Facility: CLINIC | Age: 84
End: 2024-07-30
Attending: PHYSICIAN ASSISTANT
Payer: MEDICARE

## 2024-07-30 VITALS
OXYGEN SATURATION: 96 % | SYSTOLIC BLOOD PRESSURE: 150 MMHG | TEMPERATURE: 97.5 F | DIASTOLIC BLOOD PRESSURE: 92 MMHG | WEIGHT: 183.2 LBS | BODY MASS INDEX: 29.44 KG/M2 | HEIGHT: 66 IN | HEART RATE: 79 BPM | RESPIRATION RATE: 16 BRPM

## 2024-07-30 DIAGNOSIS — I10 HYPERTENSION: ICD-10-CM

## 2024-07-30 DIAGNOSIS — R25.1 TREMULOUSNESS: ICD-10-CM

## 2024-07-30 DIAGNOSIS — R09.89 CHEST CONGESTION: ICD-10-CM

## 2024-07-30 LAB
ALBUMIN UR-MCNC: NEGATIVE MG/DL
ANION GAP SERPL CALCULATED.3IONS-SCNC: 14 MMOL/L (ref 7–15)
APPEARANCE UR: CLEAR
BASOPHILS # BLD AUTO: 0 10E3/UL (ref 0–0.2)
BASOPHILS NFR BLD AUTO: 1 %
BILIRUB UR QL STRIP: NEGATIVE
BUN SERPL-MCNC: 18.1 MG/DL (ref 8–23)
CALCIUM SERPL-MCNC: 9.7 MG/DL (ref 8.8–10.4)
CHLORIDE SERPL-SCNC: 103 MMOL/L (ref 98–107)
COLOR UR AUTO: ABNORMAL
CREAT SERPL-MCNC: 0.92 MG/DL (ref 0.51–0.95)
EGFRCR SERPLBLD CKD-EPI 2021: 61 ML/MIN/1.73M2
EOSINOPHIL # BLD AUTO: 0.1 10E3/UL (ref 0–0.7)
EOSINOPHIL NFR BLD AUTO: 2 %
ERYTHROCYTE [DISTWIDTH] IN BLOOD BY AUTOMATED COUNT: 13.2 % (ref 10–15)
FLUAV RNA SPEC QL NAA+PROBE: NEGATIVE
FLUBV RNA RESP QL NAA+PROBE: NEGATIVE
GLUCOSE SERPL-MCNC: 125 MG/DL (ref 70–99)
GLUCOSE UR STRIP-MCNC: NEGATIVE MG/DL
HCO3 SERPL-SCNC: 24 MMOL/L (ref 22–29)
HCT VFR BLD AUTO: 48.1 % (ref 35–47)
HGB BLD-MCNC: 15.8 G/DL (ref 11.7–15.7)
HGB UR QL STRIP: NEGATIVE
HOLD SPECIMEN: NORMAL
HOLD SPECIMEN: NORMAL
IMM GRANULOCYTES # BLD: 0 10E3/UL
IMM GRANULOCYTES NFR BLD: 0 %
KETONES UR STRIP-MCNC: ABNORMAL MG/DL
LEUKOCYTE ESTERASE UR QL STRIP: NEGATIVE
LYMPHOCYTES # BLD AUTO: 1.1 10E3/UL (ref 0.8–5.3)
LYMPHOCYTES NFR BLD AUTO: 20 %
MCH RBC QN AUTO: 30.3 PG (ref 26.5–33)
MCHC RBC AUTO-ENTMCNC: 32.8 G/DL (ref 31.5–36.5)
MCV RBC AUTO: 92 FL (ref 78–100)
MONOCYTES # BLD AUTO: 0.3 10E3/UL (ref 0–1.3)
MONOCYTES NFR BLD AUTO: 6 %
MUCOUS THREADS #/AREA URNS LPF: PRESENT /LPF
NEUTROPHILS # BLD AUTO: 4 10E3/UL (ref 1.6–8.3)
NEUTROPHILS NFR BLD AUTO: 72 %
NITRATE UR QL: NEGATIVE
NRBC # BLD AUTO: 0 10E3/UL
NRBC BLD AUTO-RTO: 0 /100
PH UR STRIP: 7.5 [PH] (ref 5–7)
PLATELET # BLD AUTO: 142 10E3/UL (ref 150–450)
POTASSIUM SERPL-SCNC: 4.4 MMOL/L (ref 3.4–5.3)
RBC # BLD AUTO: 5.21 10E6/UL (ref 3.8–5.2)
RBC URINE: <1 /HPF
RSV RNA SPEC NAA+PROBE: NEGATIVE
SARS-COV-2 RNA RESP QL NAA+PROBE: NEGATIVE
SODIUM SERPL-SCNC: 141 MMOL/L (ref 135–145)
SP GR UR STRIP: 1.01 (ref 1–1.03)
SQUAMOUS EPITHELIAL: 1 /HPF
TSH SERPL DL<=0.005 MIU/L-ACNC: 4.12 UIU/ML (ref 0.3–4.2)
UROBILINOGEN UR STRIP-MCNC: NORMAL MG/DL
WBC # BLD AUTO: 5.6 10E3/UL (ref 4–11)
WBC URINE: 1 /HPF

## 2024-07-30 PROCEDURE — 71046 X-RAY EXAM CHEST 2 VIEWS: CPT

## 2024-07-30 PROCEDURE — 81001 URINALYSIS AUTO W/SCOPE: CPT | Performed by: PHYSICIAN ASSISTANT

## 2024-07-30 PROCEDURE — 82565 ASSAY OF CREATININE: CPT | Performed by: PHYSICIAN ASSISTANT

## 2024-07-30 PROCEDURE — 84443 ASSAY THYROID STIM HORMONE: CPT | Performed by: PHYSICIAN ASSISTANT

## 2024-07-30 PROCEDURE — 82374 ASSAY BLOOD CARBON DIOXIDE: CPT | Performed by: PHYSICIAN ASSISTANT

## 2024-07-30 PROCEDURE — 36415 COLL VENOUS BLD VENIPUNCTURE: CPT | Performed by: PHYSICIAN ASSISTANT

## 2024-07-30 PROCEDURE — 87637 SARSCOV2&INF A&B&RSV AMP PRB: CPT | Performed by: PHYSICIAN ASSISTANT

## 2024-07-30 PROCEDURE — 85025 COMPLETE CBC W/AUTO DIFF WBC: CPT | Performed by: PHYSICIAN ASSISTANT

## 2024-07-30 PROCEDURE — 99284 EMERGENCY DEPT VISIT MOD MDM: CPT | Mod: 25

## 2024-07-30 ASSESSMENT — COLUMBIA-SUICIDE SEVERITY RATING SCALE - C-SSRS
1. IN THE PAST MONTH, HAVE YOU WISHED YOU WERE DEAD OR WISHED YOU COULD GO TO SLEEP AND NOT WAKE UP?: NO
6. HAVE YOU EVER DONE ANYTHING, STARTED TO DO ANYTHING, OR PREPARED TO DO ANYTHING TO END YOUR LIFE?: NO
2. HAVE YOU ACTUALLY HAD ANY THOUGHTS OF KILLING YOURSELF IN THE PAST MONTH?: NO

## 2024-07-30 ASSESSMENT — ACTIVITIES OF DAILY LIVING (ADL)
ADLS_ACUITY_SCORE: 35

## 2024-07-30 NOTE — DISCHARGE INSTRUCTIONS
The exact cause of your symptoms is unclear at this time. This likely represents a viral upper respiratory infection. It may take a few more days for symptoms to fully manifest. Continue to monitor temperature and blood pressure. It is not atypical that blood pressure may be somewhat elevated while you are feeling unwell initially. Consider use of Tylenol for symptomatic improvement. Stay hydrated. Follow up closely with primary doctor in the next few days for recheck with persistent symptoms. Return with significant worsening.

## 2024-07-30 NOTE — ED PROVIDER NOTES
"  Emergency Department Note      History of Present Illness     Chief Complaint   Flu Symptoms and Hypertension      HPI   Zora Holbrook is a 83 year old female with a history of breast cancer, chronic kidney disease, and hyperlipidemia who presents to the ED with flu symptoms and mild hypertension. The patient reports she has been experiencing intermittent shakes and hypertension that began two days ago. She states yesterday she felt fine but it came back when she woke up last night. She has taken some tylenol for the pain. She endorses coughing with some mucus, hearing a noise in her chest while laying down, trouble opening her mouth, and waking up with nasal congestion that goes away during the day. She denies fever, sore throat, vomiting, diarrhea, dysuria, urinary incontinence, medication changes, speech changes, vision changes, numbness/weakness, chest pain, abdominal pain, or exposure to recent illness. Of note, she did not eat or drink today and she lives alone.    Independent Historian   None    Review of External Notes   None    Past Medical History     Medical History and Problem List   ACP  Breast cancer   Chronic kidney disease, stage 3a  Colon adenomas  Degenerative arthritis of lumbar spine  Hemorrhoids, external  Hyperlipidemia  Obesity  Osteopenia  PONV  Prediabetes  Renal disease    Medications   Simvastatin     Surgical History   Dilation and curettage, hysteroscopy diagnostic, combined  Lumpectomy breast (R)  Lumpectomy breast (L)  Ovary surgery  Uterine cyst resection    Physical Exam     Patient Vitals for the past 24 hrs:   BP Temp Pulse Resp SpO2 Height Weight   07/30/24 0830 (!) 150/92 -- 79 -- 96 % -- --   07/30/24 0815 (!) 172/88 -- -- -- -- -- --   07/30/24 0801 (!) 185/96 97.5  F (36.4  C) 75 16 97 % 1.676 m (5' 6\") 83.1 kg (183 lb 3.2 oz)     Physical Exam  Constitutional: Pleasant. Cooperative.   Eyes: Pupils equally round and reactive  HENT: Head is normal in appearance. " Oropharynx is normal with moist mucus membranes.  Cardiovascular: Regular rate and rhythm and without murmurs.  Respiratory: Normal respiratory effort, lungs are clear bilaterally.  GI: Abdomen is soft, non-tender, non-distended. No guarding, rebound, or rigidity.  Musculoskeletal: No asymmetry of the lower extremities, no tenderness to palpation.   Skin: Normal, without rash.  Neurologic: Cranial nerves II-XII intact, nl cognition, no focal deficits. Alert and oriented x 3. Normal  strength. Normal leg raise. Sensation to light touch intact throughout all 4 extremities. 5/5 strength with dorsiflexion and plantarflexion bilaterally. No pronator drift. Normal finger nose finger.   Psychiatric: Normal affect.  Nursing notes and vital signs reviewed.      Diagnostics     Lab Results   Labs Ordered and Resulted from Time of ED Arrival to Time of ED Departure   BASIC METABOLIC PANEL - Abnormal       Result Value    Sodium 141      Potassium 4.4      Chloride 103      Carbon Dioxide (CO2) 24      Anion Gap 14      Urea Nitrogen 18.1      Creatinine 0.92      GFR Estimate 61      Calcium 9.7      Glucose 125 (*)    CBC WITH PLATELETS AND DIFFERENTIAL - Abnormal    WBC Count 5.6      RBC Count 5.21 (*)     Hemoglobin 15.8 (*)     Hematocrit 48.1 (*)     MCV 92      MCH 30.3      MCHC 32.8      RDW 13.2      Platelet Count 142 (*)     % Neutrophils 72      % Lymphocytes 20      % Monocytes 6      % Eosinophils 2      % Basophils 1      % Immature Granulocytes 0      NRBCs per 100 WBC 0      Absolute Neutrophils 4.0      Absolute Lymphocytes 1.1      Absolute Monocytes 0.3      Absolute Eosinophils 0.1      Absolute Basophils 0.0      Absolute Immature Granulocytes 0.0      Absolute NRBCs 0.0     ROUTINE UA WITH MICROSCOPIC REFLEX TO CULTURE - Abnormal    Color Urine Light Yellow      Appearance Urine Clear      Glucose Urine Negative      Bilirubin Urine Negative      Ketones Urine Trace (*)     Specific Gravity Urine  1.015      Blood Urine Negative      pH Urine 7.5 (*)     Protein Albumin Urine Negative      Urobilinogen Urine Normal      Nitrite Urine Negative      Leukocyte Esterase Urine Negative      Mucus Urine Present (*)     RBC Urine <1      WBC Urine 1      Squamous Epithelials Urine 1     INFLUENZA A/B, RSV, & SARS-COV2 PCR - Normal    Influenza A PCR Negative      Influenza B PCR Negative      RSV PCR Negative      SARS CoV2 PCR Negative     TSH WITH FREE T4 REFLEX - Normal    TSH 4.12         Imaging   XR Chest 2 Views   Preliminary Result   IMPRESSION: No acute cardiopulmonary disease. Stable elevated right   hemidiaphragm. Stable mild cardiomegaly.          Independent Interpretation   I personally evaluated the CXR, no obvious PNA noted.    ED Course      Medications Administered   Medications - No data to display    Discussion of Management   None    ED Course   ED Course as of 07/30/24 1128   Tue Jul 30, 2024   0910 I obtained history and examined the patient as noted above.   1059 I rechecked the patient and explained findings.       Optional/Additional Documentation  None    Medical Decision Making / Diagnosis     CMS Diagnoses: None    MIPS       None    MDM   Zora Holbrook is a 83 year old female who presents to the ED for evaluation of chest congestion, tremulousness, as well as elevated blood pressure.  See HPI as above for additional details.  Vitals and physical exam as above. Workup obtained as above.  Chest x-ray without suggestion for intrathoracic abnormality such as pneumonia and lungs clear to auscultation.  Patient has no leukocytosis.  COVID and influenza are negative. No chest pain, hypoxia, tachycardia, tachypnea to suggest for PE.  With respect to tremulousness, no electrolyte abnormality, no thyroid abnormality.  Patient denies new medications, excessive caffeine use.  Neurologic exam is normal here any.  No suggestion for infectious etiology requiring antibiotics at this time such as  otitis media, intra-abdominal pathology such as appendicitis, UTI.    No suggestion for meningitis or encephalitis currently.  Blood pressure not markedly elevated here, blood pressure at time my evaluation is 140/80.  I do not appreciate tremulousness at this time.  Patient initially requested antibiotics for home, we discussed risks associated with this and no indication currently based upon reassuring evaluation.  This is likely a viral URI that is developing.  Advise close follow-up with PCP with persistent symptoms.  Discussed importance of hydration, Tylenol use.  Felt patient was safe for discharge to home. Discussed reasons to return. All questions answered. Patient discharged to home in stable condition.    Disposition   The patient was discharged.     Diagnosis     ICD-10-CM    1. Tremulousness  R25.1       2. Hypertension  I10       3. Chest congestion  R09.89            Discharge Medications   New Prescriptions    No medications on file         Scribe Disclosure:  I, Aman Wolf, am serving as a scribe at 9:08 AM on 7/30/2024 to document services personally performed by Willis Dubose PA-C based on my observations and the provider's statements to me.     This record was created at least in part using electronic voice recognition software, so please excuse any typographical errors.       Willis Dubose PA-C  07/30/24 [id number]

## 2024-07-30 NOTE — ED TRIAGE NOTES
Patient arrives with complaints of flu like symptoms for the last few weeks. Pt reports waking up this morning shaking. Also reporting high BP all night. BEFAST negative. ABCs intact

## 2024-08-06 ENCOUNTER — OFFICE VISIT (OUTPATIENT)
Dept: FAMILY MEDICINE | Facility: CLINIC | Age: 84
End: 2024-08-06

## 2024-08-06 VITALS
HEART RATE: 78 BPM | WEIGHT: 181.8 LBS | SYSTOLIC BLOOD PRESSURE: 118 MMHG | OXYGEN SATURATION: 96 % | DIASTOLIC BLOOD PRESSURE: 77 MMHG | BODY MASS INDEX: 29.34 KG/M2

## 2024-08-06 DIAGNOSIS — H65.03 NON-RECURRENT ACUTE SEROUS OTITIS MEDIA OF BOTH EARS: ICD-10-CM

## 2024-08-06 DIAGNOSIS — E78.5 HYPERLIPIDEMIA, UNSPECIFIED HYPERLIPIDEMIA TYPE: ICD-10-CM

## 2024-08-06 DIAGNOSIS — R09.81 NASAL CONGESTION: Primary | ICD-10-CM

## 2024-08-06 DIAGNOSIS — R11.0 NAUSEA: ICD-10-CM

## 2024-08-06 PROCEDURE — 99214 OFFICE O/P EST MOD 30 MIN: CPT

## 2024-08-06 PROCEDURE — G2211 COMPLEX E/M VISIT ADD ON: HCPCS

## 2024-08-06 RX ORDER — FLUTICASONE PROPIONATE 50 MCG
1 SPRAY, SUSPENSION (ML) NASAL DAILY
Qty: 11.1 ML | Refills: 0 | Status: SHIPPED | OUTPATIENT
Start: 2024-08-06

## 2024-08-06 NOTE — PROGRESS NOTES
"  Assessment & Plan     Nasal congestion  Non-recurrent acute serous otitis media of both ears  Congestion noted on exam. Reviewed common causes. Reviewed symptomatic management with OTC saline rinses and steroid nasal spray. Red flags that warrant emergent evaluation discussed. Follow up as needed for new or worsening symptoms or if symptoms fail to improve. Patient agreeable to plan. All questions answered.    - fluticasone (FLONASE) 50 MCG/ACT nasal spray  Dispense: 11.1 mL; Refill: 0    Nausea  Likely related to PND and congestion. Reviewed OTC symptomatic options. Follow up reviewed as needed. Red flags that warrant emergent evaluation discussed. Patient agreeable to plan. All questions answered.     Hyperlipidemia, unspecified hyperlipidemia type  Continue Simvastatin 20 mg daily.          BMI  Estimated body mass index is 29.34 kg/m  as calculated from the following:    Height as of 7/30/24: 1.676 m (5' 6\").    Weight as of this encounter: 82.5 kg (181 lb 12.8 oz).   Weight management plan: Discussed healthy diet and exercise guidelines      Work on weight loss  Regular exercise  See Patient Instructions    Return if symptoms worsen or fail to improve, for Follow up.    Fanny Blakely is a 83 year old, presenting for the following health issues:  Hospital Follow-Up (Still having chills and unable to sleep as of last night, skin feels like \"it was on fire\" per pt, extreme nausea, yellow mucus (3 months)/Has not taken statin for a week because she thought it might be related )    HPI     ER follow up: 1 week ago. BP was high at ER and at home this morning. States she stomach feels a little nauseous intermittently and worse in the morning. No vomiting. Decreased appetite-chicken soup. Thought it as the Simvastatin so she stopped but still feels it going on. Feels that she has an infection in her. No cough. Some shortness of breath with stairs. Has mucus yellow and thick. No fevers. Has not tried anything " for her symptoms. Tried asking for an antibiotic in the ER but didn't get one. In the morning feels sick again waking up for the past 3 weeks. During the day feels fine.         Review of Systems  Constitutional, HEENT, cardiovascular, pulmonary, GI, , musculoskeletal, neuro, skin, endocrine and psych systems are negative, except as otherwise noted.      Objective    /77   Pulse 78   Wt 82.5 kg (181 lb 12.8 oz)   LMP  (LMP Unknown)   SpO2 96%   BMI 29.34 kg/m    Body mass index is 29.34 kg/m .  Physical Exam   GENERAL: alert and no distress  HENT: normal cephalic/atraumatic, both ears: clear effusion, nose and mouth without ulcers or lesions, nasal mucosa edematous , oropharynx clear, oral mucous membranes moist, and sinuses: not tender  RESP: lungs clear to auscultation - no rales, rhonchi or wheezes  CV: regular rate and rhythm, normal S1 S2, no S3 or S4, no murmur, click or rub, no peripheral edema  ABDOMEN: soft, nontender and bowel sounds normal  MS: no gross musculoskeletal defects noted, no edema  PSYCH: mentation appears normal, affect normal/bright          Signed Electronically by: DWAYNE James CNP

## 2024-09-04 DIAGNOSIS — E78.5 HYPERLIPIDEMIA, UNSPECIFIED HYPERLIPIDEMIA TYPE: ICD-10-CM

## 2024-09-04 RX ORDER — SIMVASTATIN 20 MG
20 TABLET ORAL AT BEDTIME
Qty: 90 TABLET | Refills: 0 | Status: SHIPPED | OUTPATIENT
Start: 2024-09-04

## 2024-09-04 NOTE — TELEPHONE ENCOUNTER
"Med: simvastatin    LOV (related): 12/11/23 AWV with Theresa Pineda CNP, 8/6/24 acute with Tammy Ruffin CNP     Last Lab: 12/11/23      Due for F/U around: 12/2024 for AWV    Next Appt: None        Cholesterol   Date Value Ref Range Status   12/11/2023 155 100 - 199 mg/dL Final   10/24/2022 125 100 - 199 mg/dl Final   10/18/2021 152 100 - 199 mg/dL Final   10/05/2020 154 100 - 199 mg/dL Final     HDL Cholesterol   Date Value Ref Range Status   10/18/2021 54 >39 mg/dL Final   10/05/2020 49 >39 mg/dL Final     HDL   Date Value Ref Range Status   12/11/2023 39 (A) 40 mg/dL Final   10/24/2022 43 40 mg/dl Final     LDL Cholesterol Calculated   Date Value Ref Range Status   10/18/2021 76 0 - 99 mg/dL Final   10/05/2020 77 0 - 99 mg/dL Final     LDL CALCULATED ([facility])   Date Value Ref Range Status   12/11/2023 89 0 - 130 mg/dL Final   10/24/2022 60 0 - 130 mg/dl Final     Triglycerides   Date Value Ref Range Status   12/11/2023 135 0 - 149 mg/dL Final   10/24/2022 108 0 - 149 mg/dl Final   10/18/2021 125 0 - 149 mg/dL Final   10/05/2020 164 (H) 0 - 149 mg/dL Final     No results found for: \"CHOLHDLRATIO\"    "

## 2024-09-10 ENCOUNTER — TRANSFERRED RECORDS (OUTPATIENT)
Dept: FAMILY MEDICINE | Facility: CLINIC | Age: 84
End: 2024-09-10

## 2024-12-03 DIAGNOSIS — E78.5 HYPERLIPIDEMIA, UNSPECIFIED HYPERLIPIDEMIA TYPE: ICD-10-CM

## 2024-12-03 RX ORDER — SIMVASTATIN 20 MG
20 TABLET ORAL AT BEDTIME
Qty: 90 TABLET | Refills: 0 | Status: SHIPPED | OUTPATIENT
Start: 2024-12-03

## 2024-12-03 NOTE — TELEPHONE ENCOUNTER
"Med: simvastatin    LOV (related): 8/6/24    Last Lab: 12/11/23      Due for F/U around: Not specified    Next Appt: Not scheduled. Left message to schedule CPX due 12/24        Cholesterol   Date Value Ref Range Status   12/11/2023 155 100 - 199 mg/dL Final   10/24/2022 125 100 - 199 mg/dl Final   10/18/2021 152 100 - 199 mg/dL Final   10/05/2020 154 100 - 199 mg/dL Final     HDL Cholesterol   Date Value Ref Range Status   10/18/2021 54 >39 mg/dL Final   10/05/2020 49 >39 mg/dL Final     HDL   Date Value Ref Range Status   12/11/2023 39 (A) >=40 mg/dL Final   10/24/2022 43 >=40 mg/dl Final     LDL Cholesterol Calculated   Date Value Ref Range Status   10/18/2021 76 0 - 99 mg/dL Final   10/05/2020 77 0 - 99 mg/dL Final     LDL CALCULATED ([facility])   Date Value Ref Range Status   12/11/2023 89 0 - 130 mg/dL Final   10/24/2022 60 0 - 130 mg/dl Final     Triglycerides   Date Value Ref Range Status   12/11/2023 135 0 - 149 mg/dL Final   10/24/2022 108 0 - 149 mg/dl Final   10/18/2021 125 0 - 149 mg/dL Final   10/05/2020 164 (H) 0 - 149 mg/dL Final     No results found for: \"CHOLHDLRATIO\"    "

## 2025-01-28 NOTE — PATIENT INSTRUCTIONS
Patient Education   Preventive Care Advice   This is general advice given by our system to help you stay healthy. However, your care team may have specific advice just for you. Please talk to your care team about your preventive care needs.  Nutrition  Eat 5 or more servings of fruits and vegetables each day.  Try wheat bread, brown rice and whole grain pasta (instead of white bread, rice, and pasta).  Get enough calcium and vitamin D. Check the label on foods and aim for 100% of the RDA (recommended daily allowance).  Lifestyle  Exercise at least 150 minutes each week  (30 minutes a day, 5 days a week).  Do muscle strengthening activities 2 days a week. These help control your weight and prevent disease.  No smoking.  Wear sunscreen to prevent skin cancer.  Have a dental exam and cleaning every 6 months.  Yearly exams  See your health care team every year to talk about:  Any changes in your health.  Any medicines your care team has prescribed.  Preventive care, family planning, and ways to prevent chronic diseases.  Shots (vaccines)   HPV shots (up to age 26), if you've never had them before.  Hepatitis B shots (up to age 59), if you've never had them before.  COVID-19 shot: Get this shot when it's due.  Flu shot: Get a flu shot every year.  Tetanus shot: Get a tetanus shot every 10 years.  Pneumococcal, hepatitis A, and RSV shots: Ask your care team if you need these based on your risk.  Shingles shot (for age 50 and up)  General health tests  Diabetes screening:  Starting at age 35, Get screened for diabetes at least every 3 years.  If you are younger than age 35, ask your care team if you should be screened for diabetes.  Cholesterol test: At age 39, start having a cholesterol test every 5 years, or more often if advised.  Bone density scan (DEXA): At age 50, ask your care team if you should have this scan for osteoporosis (brittle bones).  Hepatitis C: Get tested at least once in your life.  STIs (sexually  transmitted infections)  Before age 24: Ask your care team if you should be screened for STIs.  After age 24: Get screened for STIs if you're at risk. You are at risk for STIs (including HIV) if:  You are sexually active with more than one person.  You don't use condoms every time.  You or a partner was diagnosed with a sexually transmitted infection.  If you are at risk for HIV, ask about PrEP medicine to prevent HIV.  Get tested for HIV at least once in your life, whether you are at risk for HIV or not.  Cancer screening tests  Cervical cancer screening: If you have a cervix, begin getting regular cervical cancer screening tests starting at age 21.  Breast cancer scan (mammogram): If you've ever had breasts, begin having regular mammograms starting at age 40. This is a scan to check for breast cancer.  Colon cancer screening: It is important to start screening for colon cancer at age 45.  Have a colonoscopy test every 10 years (or more often if you're at risk) Or, ask your provider about stool tests like a FIT test every year or Cologuard test every 3 years.  To learn more about your testing options, visit:   .  For help making a decision, visit:   https://bit.ly/ns95947.  Prostate cancer screening test: If you have a prostate, ask your care team if a prostate cancer screening test (PSA) at age 55 is right for you.  Lung cancer screening: If you are a current or former smoker ages 50 to 80, ask your care team if ongoing lung cancer screenings are right for you.  For informational purposes only. Not to replace the advice of your health care provider. Copyright   2023 [employer]. All rights reserved. Clinically reviewed by the [facility] Transitions Program. [employer] [id number] - REV 01/24.

## 2025-01-30 ENCOUNTER — OFFICE VISIT (OUTPATIENT)
Dept: FAMILY MEDICINE | Facility: CLINIC | Age: 85
End: 2025-01-30

## 2025-01-30 VITALS
BODY MASS INDEX: 29.47 KG/M2 | HEART RATE: 80 BPM | HEIGHT: 66 IN | OXYGEN SATURATION: 96 % | DIASTOLIC BLOOD PRESSURE: 76 MMHG | SYSTOLIC BLOOD PRESSURE: 138 MMHG | WEIGHT: 183.4 LBS

## 2025-01-30 DIAGNOSIS — Z02.89 ENCOUNTER FOR COMPLETION OF FORM WITH PATIENT: ICD-10-CM

## 2025-01-30 DIAGNOSIS — Z00.00 ENCOUNTER FOR MEDICARE ANNUAL WELLNESS EXAM: Primary | ICD-10-CM

## 2025-01-30 DIAGNOSIS — M85.852 OSTEOPENIA OF BOTH HIPS: ICD-10-CM

## 2025-01-30 DIAGNOSIS — L82.0 INFLAMED SEBORRHEIC KERATOSIS: ICD-10-CM

## 2025-01-30 DIAGNOSIS — N18.31 CHRONIC KIDNEY DISEASE, STAGE 3A (H): ICD-10-CM

## 2025-01-30 DIAGNOSIS — R73.03 PREDIABETES: ICD-10-CM

## 2025-01-30 DIAGNOSIS — M85.851 OSTEOPENIA OF BOTH HIPS: ICD-10-CM

## 2025-01-30 DIAGNOSIS — Z23 NEED FOR VACCINATION: ICD-10-CM

## 2025-01-30 DIAGNOSIS — E78.5 HYPERLIPIDEMIA, UNSPECIFIED HYPERLIPIDEMIA TYPE: ICD-10-CM

## 2025-01-30 LAB
ANION GAP SERPL CALCULATED.3IONS-SCNC: 11 MMOL/L (ref 7–15)
BUN SERPL-MCNC: 20.8 MG/DL (ref 8–23)
CALCIUM SERPL-MCNC: 9.6 MG/DL (ref 8.8–10.4)
CHLORIDE SERPL-SCNC: 103 MMOL/L (ref 98–107)
CHOLEST SERPL-MCNC: 144 MG/DL
CREAT SERPL-MCNC: 0.94 MG/DL (ref 0.51–0.95)
EGFRCR SERPLBLD CKD-EPI 2021: 59 ML/MIN/1.73M2
EST. AVERAGE GLUCOSE BLD GHB EST-MCNC: 128 MG/DL
FASTING STATUS PATIENT QL REPORTED: YES
FASTING STATUS PATIENT QL REPORTED: YES
GLUCOSE SERPL-MCNC: 111 MG/DL (ref 70–99)
HBA1C MFR BLD: 6.1 %
HCO3 SERPL-SCNC: 26 MMOL/L (ref 22–29)
HDLC SERPL-MCNC: 50 MG/DL
LDLC SERPL CALC-MCNC: 72 MG/DL
NONHDLC SERPL-MCNC: 94 MG/DL
POTASSIUM SERPL-SCNC: 4 MMOL/L (ref 3.4–5.3)
SODIUM SERPL-SCNC: 140 MMOL/L (ref 135–145)
TRIGL SERPL-MCNC: 111 MG/DL

## 2025-01-30 PROCEDURE — 80048 BASIC METABOLIC PNL TOTAL CA: CPT | Mod: ORL

## 2025-01-30 PROCEDURE — 82465 ASSAY BLD/SERUM CHOLESTEROL: CPT | Mod: ORL

## 2025-01-30 PROCEDURE — 82565 ASSAY OF CREATININE: CPT | Mod: ORL

## 2025-01-30 PROCEDURE — 83036 HEMOGLOBIN GLYCOSYLATED A1C: CPT | Mod: ORL

## 2025-01-30 PROCEDURE — 36415 COLL VENOUS BLD VENIPUNCTURE: CPT

## 2025-01-30 RX ORDER — ROSUVASTATIN CALCIUM 10 MG/1
10 TABLET, COATED ORAL DAILY
Qty: 90 TABLET | Refills: 3 | Status: SHIPPED | OUTPATIENT
Start: 2025-01-30

## 2025-01-30 SDOH — HEALTH STABILITY: PHYSICAL HEALTH: ON AVERAGE, HOW MANY DAYS PER WEEK DO YOU ENGAGE IN MODERATE TO STRENUOUS EXERCISE (LIKE A BRISK WALK)?: 3 DAYS

## 2025-01-30 SDOH — HEALTH STABILITY: PHYSICAL HEALTH: ON AVERAGE, HOW MANY MINUTES DO YOU ENGAGE IN EXERCISE AT THIS LEVEL?: 30 MIN

## 2025-01-30 ASSESSMENT — SOCIAL DETERMINANTS OF HEALTH (SDOH): HOW OFTEN DO YOU GET TOGETHER WITH FRIENDS OR RELATIVES?: NEVER

## 2025-01-30 NOTE — PROGRESS NOTES
Preventive Care Visit  [facility]  DWAYNE James CNP, Family Medicine  Jan 30, 2025      Assessment & Plan     Encounter for Medicare annual wellness exam  Age-appropriate preventative health maintenance along with diet, exercise and healthy weight discussed.     Hyperlipidemia, unspecified hyperlipidemia type  Taking Simvastatin 20 mg daily. Patient reporting side effects. Will stop Simvastatin and trial Rosuvastatin 10 mg daily. Education about adverse effects of prescription medication discussed. Will recheck Lipids today. Red flags that warrant emergent evaluation discussed. Follow up in 1-3 months for a recheck or sooner as needed. Patient agreeable to plan. All questions answered.   - Lipid Profile  - VENOUS COLLECTION  - rosuvastatin (CRESTOR) 10 MG tablet  Dispense: 90 tablet; Refill: 3  - Lipid Profile    Osteopenia of both hips  Taking calcium and vitamin D supplementation. Last dexa 3/2024. Elevated hip fracture score of 3.8%, based on left femoral neck BMD. Declines bisphosphonate treatment today. Recheck with Dexa next year.     Prediabetes  Last A1c 6.2. Will recheck.   - VENOUS COLLECTION  - Hemoglobin A1c  - Hemoglobin A1c    Chronic kidney disease, stage 3a (H)  Will recheck. Discussed adequate fluids and avoiding NSAID medications.   - VENOUS COLLECTION  - Basic metabolic panel  - Basic metabolic panel    Inflamed seborrheic keratosis  Reviewed symptoms in detail.  A choice of liquid nitrogen was made, and the expected   blistering or scabbing reaction explained.  Liquid nitrogen was applied to 1 lesion. Red flags that warrant emergent evaluation discussed. Follow up as needed for new or worsening symptoms or if symptoms fail to improve. Patient agreeable to plan. All questions answered.   - DESTRUCT BENIGN LESION, UP TO 14    Encounter for completion of form with patient  Disability parking permit paperwork completed with patient.     Need for vaccination  - PNEUMOCOCCAL 20  "VALENT CONJUGATE (PREVNAR 20)  - ADMIN PNEUMOCOCCAL VACCINE      Patient has been advised of split billing requirements and indicates understanding: Yes        BMI  Estimated body mass index is 29.83 kg/m  as calculated from the following:    Height as of this encounter: 1.67 m (5' 5.75\").    Weight as of this encounter: 83.2 kg (183 lb 6.4 oz).   Weight management plan: Discussed healthy diet and exercise guidelines    Counseling  Appropriate preventive services were addressed with this patient via screening, questionnaire, or discussion as appropriate for fall prevention, nutrition, physical activity, Tobacco-use cessation, social engagement, weight loss and cognition.  Checklist reviewing preventive services available has been given to the patient.  Reviewed patient's diet, addressing concerns and/or questions.   She is at risk for lack of exercise and has been provided with information to increase physical activity for the benefit of her well-being.   Patient is at risk for social isolation and has been provided with information about the benefit of social connection.   She is at risk for psychosocial distress and has been provided with information to reduce risk.   Information on urinary incontinence and treatment options given to patient.       Work on weight loss  Regular exercise  See Patient Instructions    No follow-ups on file.    Fanny Blakely is a 84 year old, presenting for the following:  Physical (Fasting/), Health Maintenance (Immunizations: Tdap due/Mammo: Scheduled for 3/10/2025/DEXA: Discontinued, last 3/2024), and Derm Problem (Spot on back that is causing her to itch )            HPI  Hyperlipidemia - Taking Simvastatin 20 mg daily with once in awhile lower leg cramps primarily at night. Feels sick at the night and feels like it is the statin. Taking half a dose instead.      Prediabetes - Last A1c 6.2     CKD stage 3a - avoiding NSAIDs, not on ACE/ARB     Osteopenia bilateral hips - " Taking calcium and vitamin D. Last dexa 3/2024. Elevated hip fracture score of 3.8%, based on left femoral neck BMD. Declines treatment      Handicap parking:  had one. Would like to get one     Skin lesion on back since Oct     Health maintenance  Pneumonia shot due?  Mammo: Due in March/ Has it scheduled.   DEXA: refuses    Health Care Directive  Patient has a Health Care Directive on file  Advance care planning document is on file and is current.      1/30/2025   General Health   How would you rate your overall physical health? Good   Feel stress (tense, anxious, or unable to sleep) Very much   (!) STRESS CONCERN      1/30/2025   Nutrition   Diet: Low salt    Other   If other, please elaborate: Low sugar       Multiple values from one day are sorted in reverse-chronological order         1/30/2025   Exercise   Days per week of moderate/strenous exercise 3 days   Average minutes spent exercising at this level 30 min         1/30/2025   Social Factors   Frequency of gathering with friends or relatives Never   Worry food won't last until get money to buy more No   Food not last or not have enough money for food? No   Do you have housing? (Housing is defined as stable permanent housing and does not include staying ouside in a car, in a tent, in an abandoned building, in an overnight shelter, or couch-surfing.) Yes   Are you worried about losing your housing? No   Lack of transportation? No   Unable to get utilities (heat,electricity)? No   (!) SOCIAL CONNECTIONS CONCERN      1/30/2025   Fall Risk   Fallen 2 or more times in the past year? No    Trouble with walking or balance? No        Proxy-reported          1/30/2025   Activities of Daily Living- Home Safety   Needs help with the following daily activites None of the above   Safety concerns in the home None of the above         1/30/2025   Dental   Dentist two times every year? Yes         1/30/2025   Hearing Screening   Hearing concerns? None of the above    Hearing Screen  Left ear:  500Hz  Pass  1000Hz  Pass  2000Hz  Pass  4000Hz  Pass    Right ear:  500Hz  Fail  1000Hz  Pass  2000Hz  Pass  4000Hz  Pass       1/30/2025   Driving Risk Screening   Patient/family members have concerns about driving No         1/30/2025   General Alertness/Fatigue Screening   Have you been more tired than usual lately? No         1/30/2025   Urinary Incontinence Screening   Bothered by leaking urine in past 6 months Yes         1/30/2025   TB Screening   Were you born outside of the [place]? Yes           Today's PHQ-2 Score:       1/30/2025     9:04 AM   PHQ-2 ( 1999 Pfizer)   Q1: Little interest or pleasure in doing things 0   Q2: Feeling down, depressed or hopeless 0   PHQ-2 Score 0         1/30/2025   Substance Use   Alcohol more than 3/day or more than 7/wk No   Do you have a current opioid prescription? No   How severe/bad is pain from 1 to 10? 0/10 (No Pain)   Do you use any other substances recreationally? No    (!) PRESCRIPTION DRUGS       Multiple values from one day are sorted in reverse-chronological order     Social History     Tobacco Use    Smoking status: Never    Smokeless tobacco: Never   Substance Use Topics    Alcohol use: No     Alcohol/week: 0.0 standard drinks of alcohol    Drug use: No           3/7/2024   LAST FHS-7 RESULTS   1st degree relative breast or ovarian cancer No   Any relative bilateral breast cancer No   Any male have breast cancer No   Any ONE woman have BOTH breast AND ovarian cancer No   Any woman with breast cancer before 50yrs No   2 or more relatives with breast AND/OR ovarian cancer No   2 or more relatives with breast AND/OR bowel cancer No        Mammogram Screening - Mammogram every 1-2 years updated in Health Maintenance based on mutual decision making          Reviewed and updated as needed this visit by Provider   Tobacco  Allergies    Med Hx  Surg Hx  Fam Hx            Past Medical History:   Diagnosis Date    ACP (advance care  planning) 4-23-13    form given    Breast CA (H) 1995    lumpectomy and Radiation    Breast cancer (H)     Colon adenomas     Tubolar adenoma, colonoscopy, 2008    Degenerative arthritis of lumbar spine 2004    L5-S1    Hemorrhoids, external     Hypercholesterolemias     Osteopenia 2009    PONV (postoperative nausea and vomiting)     Renal disease     Stage 2     Past Surgical History:   Procedure Laterality Date    DILATION AND CURETTAGE, HYSTEROSCOPY DIAGNOSTIC, COMBINED  3/14/2014    Procedure: COMBINED DILATION AND CURETTAGE, HYSTEROSCOPY DIAGNOSTIC;   DILATION AND CURETTAGE, HYSTEROSCOPY, POLYPECTOMY ;  Surgeon: Guillaume Quintanilla MD;  Location: [facility]    LUMPECTOMY BREAST  1995    Cancer Right side    LUMPECTOMY BREAST      [facility],Left side    OVARY SURGERY      resection    Uterine cyste resection      [facility]     Lab work is in process  Current providers sharing in care for this patient include:  Patient Care Team:  Lauren Irwin MD as PCP - General (Family Medicine)  Rojelio Barrett MD as Assigned Heart and Vascular Provider  Theresa Pineda APRN CNP as Assigned PCP    The following health maintenance items are reviewed in Epic and correct as of today:  Health Maintenance   Topic Date Due    DTAP/TDAP/TD IMMUNIZATION (1 - Tdap) 03/18/2009    LIPID  12/11/2024    MAMMO SCREENING  03/07/2025    BMP  07/30/2025    HEMOGLOBIN  07/30/2025    MEDICARE ANNUAL WELLNESS VISIT  01/30/2026    FALL RISK ASSESSMENT  01/30/2026    ADVANCE CARE PLANNING  01/30/2030    PHQ-2 (once per calendar year)  Completed    INFLUENZA VACCINE  Completed    Pneumococcal Vaccine: 50+ Years  Completed    URINALYSIS  Completed    ZOSTER IMMUNIZATION  Completed    RSV VACCINE  Completed    COVID-19 Vaccine  Completed    HPV IMMUNIZATION  Aged Out    MENINGITIS IMMUNIZATION  Aged Out    RSV MONOCLONAL ANTIBODY  Aged Out    DEXA  Discontinued    MICROALBUMIN  Discontinued    COLORECTAL CANCER SCREENING   "Discontinued         Review of Systems  Constitutional, HEENT, cardiovascular, pulmonary, GI, , musculoskeletal, neuro, skin, endocrine and psych systems are negative, except as otherwise noted.     Objective    Exam  /76   Pulse 80   Ht 1.67 m (5' 5.75\")   Wt 83.2 kg (183 lb 6.4 oz)   LMP  (LMP Unknown)   SpO2 96%   BMI 29.83 kg/m     Estimated body mass index is 29.83 kg/m  as calculated from the following:    Height as of this encounter: 1.67 m (5' 5.75\").    Weight as of this encounter: 83.2 kg (183 lb 6.4 oz).    Physical Exam  GENERAL: alert and no distress  EYES: Eyes grossly normal to inspection, PERRL and conjunctivae and sclerae normal  HENT: ear canals and TM's normal, nose and mouth without ulcers or lesions  NECK: no adenopathy, no asymmetry, masses, or scars  RESP: lungs clear to auscultation - no rales, rhonchi or wheezes  BREAST: deferred. Will get UTD mammogram  CV: regular rate and rhythm, normal S1 S2, no S3 or S4, no murmur, click or rub, no peripheral edema  ABDOMEN: soft, nontender, no hepatosplenomegaly, no masses and bowel sounds normal  MS: no gross musculoskeletal defects noted, no edema  SKIN: no suspicious lesions or rashes  NEURO: Normal strength and tone, mentation intact and speech normal  PSYCH: mentation appears normal, affect normal/bright    PROCEDURE:    After the potential risks were discussed, verbal consent was obtained.  Approximately 15 seconds of freeze time was applied to the lesion using open spray technique with a 1 mm margin using 3 freeze-thaw cycles.  There were no immediate complications.  The patient tolerated the procedure well.  Frequent use of vaseline was recommended to promote good healing.  Signs of infection or complications were discussed and understood.          1/30/2025   Mini Cog   Clock Draw Score 2 Normal   3 Item Recall 2 objects recalled   Mini Cog Total Score 4              Signed Electronically by: DWAYNE James CNP    "

## 2025-03-10 ENCOUNTER — HOSPITAL ENCOUNTER (OUTPATIENT)
Dept: MAMMOGRAPHY | Facility: CLINIC | Age: 85
Discharge: HOME | End: 2025-03-10
Attending: FAMILY MEDICINE | Admitting: FAMILY MEDICINE
Payer: MEDICARE

## 2025-03-10 DIAGNOSIS — Z12.31 VISIT FOR SCREENING MAMMOGRAM: ICD-10-CM

## 2025-03-10 PROCEDURE — 77063 BREAST TOMOSYNTHESIS BI: CPT

## 2025-03-10 PROCEDURE — 77067 SCR MAMMO BI INCL CAD: CPT
